# Patient Record
Sex: MALE | Employment: OTHER | URBAN - METROPOLITAN AREA
[De-identification: names, ages, dates, MRNs, and addresses within clinical notes are randomized per-mention and may not be internally consistent; named-entity substitution may affect disease eponyms.]

---

## 2018-07-20 ENCOUNTER — HOSPITAL ENCOUNTER (OUTPATIENT)
Dept: PREADMISSION TESTING | Age: 73
Discharge: HOME | End: 2018-07-20
Payer: MEDICARE

## 2018-07-20 VITALS
RESPIRATION RATE: 18 BRPM | TEMPERATURE: 98 F | HEART RATE: 62 BPM | BODY MASS INDEX: 24.06 KG/M2 | DIASTOLIC BLOOD PRESSURE: 77 MMHG | SYSTOLIC BLOOD PRESSURE: 128 MMHG | WEIGHT: 187.5 LBS | HEIGHT: 74 IN

## 2018-07-20 LAB
ANION GAP SERPL CALC-SCNC: 6 MMOL/L (ref 5–15)
BASOPHILS # BLD: 0.1 K/UL (ref 0–0.1)
BASOPHILS NFR BLD: 1 % (ref 0–1)
BUN SERPL-MCNC: 25 MG/DL (ref 6–20)
BUN/CREAT SERPL: 19 (ref 12–20)
CALCIUM SERPL-MCNC: 8.5 MG/DL (ref 8.5–10.1)
CHLORIDE SERPL-SCNC: 105 MMOL/L (ref 97–108)
CO2 SERPL-SCNC: 32 MMOL/L (ref 21–32)
CREAT SERPL-MCNC: 1.31 MG/DL (ref 0.7–1.3)
DIFFERENTIAL METHOD BLD: ABNORMAL
EOSINOPHIL # BLD: 0.4 K/UL (ref 0–0.4)
EOSINOPHIL NFR BLD: 6 % (ref 0–7)
ERYTHROCYTE [DISTWIDTH] IN BLOOD BY AUTOMATED COUNT: 12.7 % (ref 11.5–14.5)
GLUCOSE SERPL-MCNC: 90 MG/DL (ref 65–100)
HCT VFR BLD AUTO: 31.7 % (ref 36.6–50.3)
HGB BLD-MCNC: 10.4 G/DL (ref 12.1–17)
IMM GRANULOCYTES # BLD: 0 K/UL (ref 0–0.04)
IMM GRANULOCYTES NFR BLD AUTO: 0 % (ref 0–0.5)
LYMPHOCYTES # BLD: 1.1 K/UL (ref 0.8–3.5)
LYMPHOCYTES NFR BLD: 17 % (ref 12–49)
MCH RBC QN AUTO: 33.5 PG (ref 26–34)
MCHC RBC AUTO-ENTMCNC: 32.8 G/DL (ref 30–36.5)
MCV RBC AUTO: 102.3 FL (ref 80–99)
MONOCYTES # BLD: 0.8 K/UL (ref 0–1)
MONOCYTES NFR BLD: 13 % (ref 5–13)
NEUTS SEG # BLD: 3.8 K/UL (ref 1.8–8)
NEUTS SEG NFR BLD: 62 % (ref 32–75)
NRBC # BLD: 0 K/UL (ref 0–0.01)
NRBC BLD-RTO: 0 PER 100 WBC
PLATELET # BLD AUTO: 305 K/UL (ref 150–400)
PMV BLD AUTO: 11 FL (ref 8.9–12.9)
POTASSIUM SERPL-SCNC: 3.9 MMOL/L (ref 3.5–5.1)
RBC # BLD AUTO: 3.1 M/UL (ref 4.1–5.7)
SODIUM SERPL-SCNC: 143 MMOL/L (ref 136–145)
WBC # BLD AUTO: 6.2 K/UL (ref 4.1–11.1)

## 2018-07-20 PROCEDURE — 93005 ELECTROCARDIOGRAM TRACING: CPT

## 2018-07-20 PROCEDURE — 85025 COMPLETE CBC W/AUTO DIFF WBC: CPT | Performed by: OTOLARYNGOLOGY

## 2018-07-20 PROCEDURE — 80048 BASIC METABOLIC PNL TOTAL CA: CPT | Performed by: OTOLARYNGOLOGY

## 2018-07-20 RX ORDER — CARBIDOPA AND LEVODOPA 10; 100 MG/1; MG/1
1 TABLET, ORALLY DISINTEGRATING ORAL 3 TIMES DAILY
Status: ON HOLD | COMMUNITY
End: 2018-07-27 | Stop reason: SDUPTHER

## 2018-07-20 RX ORDER — FLUDROCORTISONE ACETATE 0.1 MG/1
0.2 TABLET ORAL 2 TIMES DAILY
COMMUNITY

## 2018-07-20 RX ORDER — VENLAFAXINE 75 MG/1
75 TABLET ORAL DAILY
Status: ON HOLD | COMMUNITY
End: 2018-07-26

## 2018-07-20 RX ORDER — PETROLATUM 42 G/100G
OINTMENT TOPICAL AS NEEDED
COMMUNITY
End: 2019-05-09

## 2018-07-20 RX ORDER — ACETAMINOPHEN 500 MG
500 TABLET ORAL
COMMUNITY
End: 2019-08-25

## 2018-07-20 RX ORDER — DULOXETIN HYDROCHLORIDE 60 MG/1
60 CAPSULE, DELAYED RELEASE ORAL
COMMUNITY

## 2018-07-20 RX ORDER — DIPHENHYDRAMINE HCL 25 MG
25 CAPSULE ORAL
COMMUNITY
End: 2019-05-09

## 2018-07-20 NOTE — PERIOP NOTES
PAT INTERVIEW COMPLETED WITH PT.  INFECTION PREVENTION INFORMATION GIVEN TO PT.  PT WAS GIVEN THE OPPORTUNITY TO ASK ADDITIONAL QUESTIONS.    WRITTEN DIRECTIONS ALSO SENT WITH PT'S BROTHER  TO GIVE TO [employer] [facility], WITH PAT PHONE NUMBER. PTS BROTHER ASKED TO BRING DNR PAPER WORK FROM [employer], PT VOICED THESE ARE HIS WISHES.

## 2018-07-21 LAB
ATRIAL RATE: 63 BPM
CALCULATED P AXIS, ECG09: 47 DEGREES
CALCULATED R AXIS, ECG10: 11 DEGREES
CALCULATED T AXIS, ECG11: 55 DEGREES
DIAGNOSIS, 93000: NORMAL
P-R INTERVAL, ECG05: 166 MS
Q-T INTERVAL, ECG07: 430 MS
QRS DURATION, ECG06: 74 MS
QTC CALCULATION (BEZET), ECG08: 440 MS
VENTRICULAR RATE, ECG03: 63 BPM

## 2018-07-25 ENCOUNTER — ANESTHESIA EVENT (OUTPATIENT)
Dept: SURGERY | Age: 73
DRG: 578 | End: 2018-07-25
Payer: MEDICARE

## 2018-07-26 ENCOUNTER — ANESTHESIA (OUTPATIENT)
Dept: SURGERY | Age: 73
DRG: 578 | End: 2018-07-26
Payer: MEDICARE

## 2018-07-26 ENCOUNTER — HOSPITAL ENCOUNTER (INPATIENT)
Age: 73
LOS: 1 days | Discharge: HOME | DRG: 578 | End: 2018-07-27
Attending: OTOLARYNGOLOGY | Admitting: OTOLARYNGOLOGY
Payer: MEDICARE

## 2018-07-26 DIAGNOSIS — C44.311 BASAL CELL CARCINOMA (BCC) OF SUPRATIP OF NOSE: Primary | ICD-10-CM

## 2018-07-26 PROCEDURE — 0HX3XZZ TRANSFER LEFT EAR SKIN, EXTERNAL APPROACH: ICD-10-PCS | Performed by: OTOLARYNGOLOGY

## 2018-07-26 PROCEDURE — 74011000250 HC RX REV CODE- 250

## 2018-07-26 PROCEDURE — 77030008684 HC TU ET CUF COVD -B: Performed by: ANESTHESIOLOGY

## 2018-07-26 PROCEDURE — 77030031139 HC SUT VCRL2 J&J -A: Performed by: OTOLARYNGOLOGY

## 2018-07-26 PROCEDURE — 74011250636 HC RX REV CODE- 250/636

## 2018-07-26 PROCEDURE — 77030026438 HC STYL ET INTUB CARD -A: Performed by: ANESTHESIOLOGY

## 2018-07-26 PROCEDURE — 77030018836 HC SOL IRR NACL ICUM -A: Performed by: OTOLARYNGOLOGY

## 2018-07-26 PROCEDURE — 76060000036 HC ANESTHESIA 2.5 TO 3 HR: Performed by: OTOLARYNGOLOGY

## 2018-07-26 PROCEDURE — 74011258636 HC RX REV CODE- 258/636: Performed by: OTOLARYNGOLOGY

## 2018-07-26 PROCEDURE — 74011000250 HC RX REV CODE- 250: Performed by: OTOLARYNGOLOGY

## 2018-07-26 PROCEDURE — 76210000016 HC OR PH I REC 1 TO 1.5 HR: Performed by: OTOLARYNGOLOGY

## 2018-07-26 PROCEDURE — 77030019908 HC STETH ESOPH SIMS -A: Performed by: ANESTHESIOLOGY

## 2018-07-26 PROCEDURE — 77030002996 HC SUT SLK J&J -A: Performed by: OTOLARYNGOLOGY

## 2018-07-26 PROCEDURE — 65270000029 HC RM PRIVATE

## 2018-07-26 PROCEDURE — 74011250636 HC RX REV CODE- 250/636: Performed by: ANESTHESIOLOGY

## 2018-07-26 PROCEDURE — 88305 TISSUE EXAM BY PATHOLOGIST: CPT | Performed by: OTOLARYNGOLOGY

## 2018-07-26 PROCEDURE — 77030002974 HC SUT PLN J&J -A: Performed by: OTOLARYNGOLOGY

## 2018-07-26 PROCEDURE — 77030013079 HC BLNKT BAIR HGGR 3M -A: Performed by: ANESTHESIOLOGY

## 2018-07-26 PROCEDURE — 77030011640 HC PAD GRND REM COVD -A: Performed by: OTOLARYNGOLOGY

## 2018-07-26 PROCEDURE — 77030020782 HC GWN BAIR PAWS FLX 3M -B

## 2018-07-26 PROCEDURE — 09UK07Z SUPPLEMENT NASAL MUCOSA AND SOFT TISSUE WITH AUTOLOGOUS TISSUE SUBSTITUTE, OPEN APPROACH: ICD-10-PCS | Performed by: OTOLARYNGOLOGY

## 2018-07-26 PROCEDURE — 0HX1XZZ TRANSFER FACE SKIN, EXTERNAL APPROACH: ICD-10-PCS | Performed by: OTOLARYNGOLOGY

## 2018-07-26 PROCEDURE — 0HX0XZZ TRANSFER SCALP SKIN, EXTERNAL APPROACH: ICD-10-PCS | Performed by: OTOLARYNGOLOGY

## 2018-07-26 PROCEDURE — 76010000172 HC OR TIME 2.5 TO 3 HR INTENSV-TIER 1: Performed by: OTOLARYNGOLOGY

## 2018-07-26 PROCEDURE — 77030036668 HC HEMSTAT APPL W/HEMADERM KT -BARD -F: Performed by: OTOLARYNGOLOGY

## 2018-07-26 PROCEDURE — 77030011267 HC ELECTRD BLD COVD -A: Performed by: OTOLARYNGOLOGY

## 2018-07-26 PROCEDURE — 09BK0ZZ EXCISION OF NASAL MUCOSA AND SOFT TISSUE, OPEN APPROACH: ICD-10-PCS | Performed by: OTOLARYNGOLOGY

## 2018-07-26 PROCEDURE — 74011250636 HC RX REV CODE- 250/636: Performed by: OTOLARYNGOLOGY

## 2018-07-26 PROCEDURE — 77030018846 HC SOL IRR STRL H20 ICUM -A: Performed by: OTOLARYNGOLOGY

## 2018-07-26 PROCEDURE — 88331 PATH CONSLTJ SURG 1 BLK 1SPC: CPT | Performed by: OTOLARYNGOLOGY

## 2018-07-26 PROCEDURE — 77030032490 HC SLV COMPR SCD KNE COVD -B: Performed by: OTOLARYNGOLOGY

## 2018-07-26 RX ORDER — SODIUM CHLORIDE 0.9 % (FLUSH) 0.9 %
5-10 SYRINGE (ML) INJECTION EVERY 8 HOURS
Status: DISCONTINUED | OUTPATIENT
Start: 2018-07-26 | End: 2018-07-27 | Stop reason: HOSPADM

## 2018-07-26 RX ORDER — DEXAMETHASONE SODIUM PHOSPHATE 4 MG/ML
INJECTION, SOLUTION INTRA-ARTICULAR; INTRALESIONAL; INTRAMUSCULAR; INTRAVENOUS; SOFT TISSUE AS NEEDED
Status: DISCONTINUED | OUTPATIENT
Start: 2018-07-26 | End: 2018-07-26 | Stop reason: HOSPADM

## 2018-07-26 RX ORDER — BACITRACIN ZINC 500 UNIT/G
OINTMENT (GRAM) TOPICAL 2 TIMES DAILY
Qty: 15 G | Refills: 0 | Status: SHIPPED | OUTPATIENT
Start: 2018-07-26 | End: 2019-05-09 | Stop reason: CLARIF

## 2018-07-26 RX ORDER — OXYCODONE AND ACETAMINOPHEN 5; 325 MG/1; MG/1
1 TABLET ORAL AS NEEDED
Status: DISCONTINUED | OUTPATIENT
Start: 2018-07-26 | End: 2018-07-26 | Stop reason: HOSPADM

## 2018-07-26 RX ORDER — PROPOFOL 10 MG/ML
INJECTION, EMULSION INTRAVENOUS AS NEEDED
Status: DISCONTINUED | OUTPATIENT
Start: 2018-07-26 | End: 2018-07-26 | Stop reason: HOSPADM

## 2018-07-26 RX ORDER — LEVOTHYROXINE SODIUM 112 UG/1
224 TABLET ORAL
Status: DISCONTINUED | OUTPATIENT
Start: 2018-07-27 | End: 2018-07-27 | Stop reason: HOSPADM

## 2018-07-26 RX ORDER — CEPHALEXIN 250 MG/1
500 CAPSULE ORAL 3 TIMES DAILY
Qty: 42 CAP | Refills: 0 | Status: SHIPPED | OUTPATIENT
Start: 2018-07-26 | End: 2018-08-02

## 2018-07-26 RX ORDER — VENLAFAXINE HYDROCHLORIDE 37.5 MG/1
150 CAPSULE, EXTENDED RELEASE ORAL
Status: DISCONTINUED | OUTPATIENT
Start: 2018-07-27 | End: 2018-07-27 | Stop reason: HOSPADM

## 2018-07-26 RX ORDER — HYDROCODONE BITARTRATE AND ACETAMINOPHEN 7.5; 325 MG/1; MG/1
2 TABLET ORAL
Qty: 30 TAB | Refills: 0 | Status: SHIPPED | OUTPATIENT
Start: 2018-07-26 | End: 2019-05-09

## 2018-07-26 RX ORDER — SODIUM CHLORIDE 0.9 % (FLUSH) 0.9 %
5-10 SYRINGE (ML) INJECTION EVERY 8 HOURS
Status: DISCONTINUED | OUTPATIENT
Start: 2018-07-26 | End: 2018-07-26 | Stop reason: HOSPADM

## 2018-07-26 RX ORDER — CARBIDOPA AND LEVODOPA 10; 100 MG/1; MG/1
1 TABLET ORAL 3 TIMES DAILY
Status: DISCONTINUED | OUTPATIENT
Start: 2018-07-27 | End: 2018-07-27 | Stop reason: HOSPADM

## 2018-07-26 RX ORDER — MIDAZOLAM HYDROCHLORIDE 1 MG/ML
1 INJECTION, SOLUTION INTRAMUSCULAR; INTRAVENOUS AS NEEDED
Status: DISCONTINUED | OUTPATIENT
Start: 2018-07-26 | End: 2018-07-26 | Stop reason: HOSPADM

## 2018-07-26 RX ORDER — FLUDROCORTISONE ACETATE 0.1 MG/1
100 TABLET ORAL DAILY
Status: DISCONTINUED | OUTPATIENT
Start: 2018-07-27 | End: 2018-07-27 | Stop reason: HOSPADM

## 2018-07-26 RX ORDER — SODIUM CHLORIDE 9 MG/ML
25 INJECTION, SOLUTION INTRAVENOUS CONTINUOUS
Status: DISPENSED | OUTPATIENT
Start: 2018-07-26 | End: 2018-07-27

## 2018-07-26 RX ORDER — EPHEDRINE SULFATE 50 MG/ML
INJECTION, SOLUTION INTRAVENOUS AS NEEDED
Status: DISCONTINUED | OUTPATIENT
Start: 2018-07-26 | End: 2018-07-26 | Stop reason: HOSPADM

## 2018-07-26 RX ORDER — VENLAFAXINE HYDROCHLORIDE 150 MG/1
150 CAPSULE, EXTENDED RELEASE ORAL DAILY
COMMUNITY
End: 2019-05-09

## 2018-07-26 RX ORDER — BACITRACIN 500 [USP'U]/G
OINTMENT TOPICAL 2 TIMES DAILY
Status: DISCONTINUED | OUTPATIENT
Start: 2018-07-27 | End: 2018-07-27 | Stop reason: HOSPADM

## 2018-07-26 RX ORDER — SODIUM CHLORIDE 0.9 % (FLUSH) 0.9 %
5-10 SYRINGE (ML) INJECTION AS NEEDED
Status: DISCONTINUED | OUTPATIENT
Start: 2018-07-26 | End: 2018-07-26 | Stop reason: HOSPADM

## 2018-07-26 RX ORDER — FENTANYL CITRATE 50 UG/ML
25 INJECTION, SOLUTION INTRAMUSCULAR; INTRAVENOUS
Status: DISCONTINUED | OUTPATIENT
Start: 2018-07-26 | End: 2018-07-26 | Stop reason: HOSPADM

## 2018-07-26 RX ORDER — SUCCINYLCHOLINE CHLORIDE 20 MG/ML
INJECTION INTRAMUSCULAR; INTRAVENOUS AS NEEDED
Status: DISCONTINUED | OUTPATIENT
Start: 2018-07-26 | End: 2018-07-26 | Stop reason: HOSPADM

## 2018-07-26 RX ORDER — SODIUM CHLORIDE, SODIUM LACTATE, POTASSIUM CHLORIDE, CALCIUM CHLORIDE 600; 310; 30; 20 MG/100ML; MG/100ML; MG/100ML; MG/100ML
125 INJECTION, SOLUTION INTRAVENOUS CONTINUOUS
Status: DISCONTINUED | OUTPATIENT
Start: 2018-07-26 | End: 2018-07-26 | Stop reason: HOSPADM

## 2018-07-26 RX ORDER — SODIUM CHLORIDE 0.9 % (FLUSH) 0.9 %
5-10 SYRINGE (ML) INJECTION AS NEEDED
Status: DISCONTINUED | OUTPATIENT
Start: 2018-07-26 | End: 2018-07-27 | Stop reason: HOSPADM

## 2018-07-26 RX ORDER — SODIUM CHLORIDE, SODIUM LACTATE, POTASSIUM CHLORIDE, CALCIUM CHLORIDE 600; 310; 30; 20 MG/100ML; MG/100ML; MG/100ML; MG/100ML
INJECTION, SOLUTION INTRAVENOUS
Status: DISCONTINUED | OUTPATIENT
Start: 2018-07-26 | End: 2018-07-26 | Stop reason: HOSPADM

## 2018-07-26 RX ORDER — MIDAZOLAM HYDROCHLORIDE 1 MG/ML
0.5 INJECTION, SOLUTION INTRAMUSCULAR; INTRAVENOUS
Status: DISCONTINUED | OUTPATIENT
Start: 2018-07-26 | End: 2018-07-26 | Stop reason: HOSPADM

## 2018-07-26 RX ORDER — ONDANSETRON 2 MG/ML
4 INJECTION INTRAMUSCULAR; INTRAVENOUS AS NEEDED
Status: DISCONTINUED | OUTPATIENT
Start: 2018-07-26 | End: 2018-07-26 | Stop reason: HOSPADM

## 2018-07-26 RX ORDER — ACETAMINOPHEN 325 MG/1
650 TABLET ORAL
Status: DISCONTINUED | OUTPATIENT
Start: 2018-07-26 | End: 2018-07-27 | Stop reason: HOSPADM

## 2018-07-26 RX ORDER — DULOXETIN HYDROCHLORIDE 60 MG/1
60 CAPSULE, DELAYED RELEASE ORAL
Status: DISCONTINUED | OUTPATIENT
Start: 2018-07-26 | End: 2018-07-27 | Stop reason: HOSPADM

## 2018-07-26 RX ORDER — PROPOFOL 10 MG/ML
INJECTION, EMULSION INTRAVENOUS
Status: DISCONTINUED | OUTPATIENT
Start: 2018-07-26 | End: 2018-07-26 | Stop reason: HOSPADM

## 2018-07-26 RX ORDER — DIPHENHYDRAMINE HYDROCHLORIDE 50 MG/ML
12.5 INJECTION, SOLUTION INTRAMUSCULAR; INTRAVENOUS AS NEEDED
Status: DISCONTINUED | OUTPATIENT
Start: 2018-07-26 | End: 2018-07-26 | Stop reason: HOSPADM

## 2018-07-26 RX ORDER — MORPHINE SULFATE 10 MG/ML
2 INJECTION, SOLUTION INTRAMUSCULAR; INTRAVENOUS
Status: DISCONTINUED | OUTPATIENT
Start: 2018-07-26 | End: 2018-07-26 | Stop reason: HOSPADM

## 2018-07-26 RX ORDER — MORPHINE SULFATE 1 MG/ML
2 INJECTION, SOLUTION EPIDURAL; INTRATHECAL; INTRAVENOUS
Status: DISCONTINUED | OUTPATIENT
Start: 2018-07-26 | End: 2018-07-27 | Stop reason: HOSPADM

## 2018-07-26 RX ORDER — CEFAZOLIN SODIUM/WATER 2 G/20 ML
2 SYRINGE (ML) INTRAVENOUS ONCE
Status: COMPLETED | OUTPATIENT
Start: 2018-07-26 | End: 2018-07-26

## 2018-07-26 RX ORDER — ONDANSETRON 2 MG/ML
INJECTION INTRAMUSCULAR; INTRAVENOUS AS NEEDED
Status: DISCONTINUED | OUTPATIENT
Start: 2018-07-26 | End: 2018-07-26 | Stop reason: HOSPADM

## 2018-07-26 RX ORDER — FENTANYL CITRATE 50 UG/ML
INJECTION, SOLUTION INTRAMUSCULAR; INTRAVENOUS AS NEEDED
Status: DISCONTINUED | OUTPATIENT
Start: 2018-07-26 | End: 2018-07-26 | Stop reason: HOSPADM

## 2018-07-26 RX ORDER — LIDOCAINE HYDROCHLORIDE 10 MG/ML
0.1 INJECTION, SOLUTION EPIDURAL; INFILTRATION; INTRACAUDAL; PERINEURAL AS NEEDED
Status: DISCONTINUED | OUTPATIENT
Start: 2018-07-26 | End: 2018-07-26 | Stop reason: HOSPADM

## 2018-07-26 RX ORDER — ROCURONIUM BROMIDE 10 MG/ML
INJECTION, SOLUTION INTRAVENOUS AS NEEDED
Status: DISCONTINUED | OUTPATIENT
Start: 2018-07-26 | End: 2018-07-26 | Stop reason: HOSPADM

## 2018-07-26 RX ORDER — OXYCODONE AND ACETAMINOPHEN 5; 325 MG/1; MG/1
1 TABLET ORAL
Status: DISCONTINUED | OUTPATIENT
Start: 2018-07-26 | End: 2018-07-27 | Stop reason: HOSPADM

## 2018-07-26 RX ORDER — DEXTROSE, SODIUM CHLORIDE, SODIUM LACTATE, POTASSIUM CHLORIDE, AND CALCIUM CHLORIDE 5; .6; .31; .03; .02 G/100ML; G/100ML; G/100ML; G/100ML; G/100ML
100 INJECTION, SOLUTION INTRAVENOUS CONTINUOUS
Status: DISPENSED | OUTPATIENT
Start: 2018-07-26 | End: 2018-07-27

## 2018-07-26 RX ORDER — VENLAFAXINE 37.5 MG/1
75 TABLET ORAL DAILY
Status: DISCONTINUED | OUTPATIENT
Start: 2018-07-27 | End: 2018-07-26 | Stop reason: SDUPTHER

## 2018-07-26 RX ORDER — DIPHENHYDRAMINE HCL 25 MG
25 CAPSULE ORAL
Status: DISCONTINUED | OUTPATIENT
Start: 2018-07-26 | End: 2018-07-27 | Stop reason: HOSPADM

## 2018-07-26 RX ORDER — CEFAZOLIN SODIUM/WATER 2 G/20 ML
2 SYRINGE (ML) INTRAVENOUS
Status: DISCONTINUED | OUTPATIENT
Start: 2018-07-26 | End: 2018-07-26 | Stop reason: SDUPTHER

## 2018-07-26 RX ORDER — LIDOCAINE HYDROCHLORIDE 20 MG/ML
INJECTION, SOLUTION EPIDURAL; INFILTRATION; INTRACAUDAL; PERINEURAL AS NEEDED
Status: DISCONTINUED | OUTPATIENT
Start: 2018-07-26 | End: 2018-07-26 | Stop reason: HOSPADM

## 2018-07-26 RX ORDER — HYDROCODONE BITARTRATE AND ACETAMINOPHEN 5; 325 MG/1; MG/1
2 TABLET ORAL
Qty: 30 TAB | Refills: 0 | Status: SHIPPED | OUTPATIENT
Start: 2018-07-26 | End: 2019-05-09

## 2018-07-26 RX ORDER — FENTANYL CITRATE 50 UG/ML
50 INJECTION, SOLUTION INTRAMUSCULAR; INTRAVENOUS AS NEEDED
Status: DISCONTINUED | OUTPATIENT
Start: 2018-07-26 | End: 2018-07-26 | Stop reason: HOSPADM

## 2018-07-26 RX ORDER — MIDAZOLAM HYDROCHLORIDE 1 MG/ML
INJECTION, SOLUTION INTRAMUSCULAR; INTRAVENOUS AS NEEDED
Status: DISCONTINUED | OUTPATIENT
Start: 2018-07-26 | End: 2018-07-26 | Stop reason: HOSPADM

## 2018-07-26 RX ADMIN — FENTANYL CITRATE 50 MCG: 50 INJECTION, SOLUTION INTRAMUSCULAR; INTRAVENOUS at 18:00

## 2018-07-26 RX ADMIN — EPHEDRINE SULFATE 10 MG: 50 INJECTION, SOLUTION INTRAVENOUS at 17:50

## 2018-07-26 RX ADMIN — SODIUM CHLORIDE, SODIUM LACTATE, POTASSIUM CHLORIDE, CALCIUM CHLORIDE: 600; 310; 30; 20 INJECTION, SOLUTION INTRAVENOUS at 17:45

## 2018-07-26 RX ADMIN — MIDAZOLAM HYDROCHLORIDE 1 MG: 1 INJECTION, SOLUTION INTRAMUSCULAR; INTRAVENOUS at 17:45

## 2018-07-26 RX ADMIN — PROPOFOL 120 MG: 10 INJECTION, EMULSION INTRAVENOUS at 17:45

## 2018-07-26 RX ADMIN — LIDOCAINE HYDROCHLORIDE 40 MG: 20 INJECTION, SOLUTION EPIDURAL; INFILTRATION; INTRACAUDAL; PERINEURAL at 17:45

## 2018-07-26 RX ADMIN — EPHEDRINE SULFATE 10 MG: 50 INJECTION, SOLUTION INTRAVENOUS at 18:21

## 2018-07-26 RX ADMIN — PROPOFOL 30 MG: 10 INJECTION, EMULSION INTRAVENOUS at 19:10

## 2018-07-26 RX ADMIN — EPHEDRINE SULFATE 10 MG: 50 INJECTION, SOLUTION INTRAVENOUS at 18:49

## 2018-07-26 RX ADMIN — SUCCINYLCHOLINE CHLORIDE 140 MG: 20 INJECTION INTRAMUSCULAR; INTRAVENOUS at 17:45

## 2018-07-26 RX ADMIN — EPHEDRINE SULFATE 10 MG: 50 INJECTION, SOLUTION INTRAVENOUS at 18:06

## 2018-07-26 RX ADMIN — SODIUM CHLORIDE, SODIUM LACTATE, POTASSIUM CHLORIDE, CALCIUM CHLORIDE, AND DEXTROSE MONOHYDRATE 100 ML/HR: 600; 310; 30; 20; 5 INJECTION, SOLUTION INTRAVENOUS at 21:00

## 2018-07-26 RX ADMIN — Medication 2 G: at 17:50

## 2018-07-26 RX ADMIN — ONDANSETRON 4 MG: 2 INJECTION INTRAMUSCULAR; INTRAVENOUS at 18:29

## 2018-07-26 RX ADMIN — MORPHINE SULFATE 2 MG: 10 INJECTION INTRAVENOUS at 21:10

## 2018-07-26 RX ADMIN — FENTANYL CITRATE 25 MCG: 50 INJECTION, SOLUTION INTRAMUSCULAR; INTRAVENOUS at 17:45

## 2018-07-26 RX ADMIN — PROPOFOL 50 MG: 10 INJECTION, EMULSION INTRAVENOUS at 18:37

## 2018-07-26 RX ADMIN — PROPOFOL 30 MG: 10 INJECTION, EMULSION INTRAVENOUS at 18:30

## 2018-07-26 RX ADMIN — FENTANYL CITRATE 25 MCG: 50 INJECTION, SOLUTION INTRAMUSCULAR; INTRAVENOUS at 19:10

## 2018-07-26 RX ADMIN — MORPHINE SULFATE 2 MG: 10 INJECTION INTRAVENOUS at 21:17

## 2018-07-26 RX ADMIN — PROPOFOL 75 MCG/KG/MIN: 10 INJECTION, EMULSION INTRAVENOUS at 18:59

## 2018-07-26 RX ADMIN — SODIUM CHLORIDE, SODIUM LACTATE, POTASSIUM CHLORIDE, AND CALCIUM CHLORIDE 125 ML/HR: 600; 310; 30; 20 INJECTION, SOLUTION INTRAVENOUS at 16:05

## 2018-07-26 RX ADMIN — ROCURONIUM BROMIDE 5 MG: 10 INJECTION, SOLUTION INTRAVENOUS at 17:45

## 2018-07-26 RX ADMIN — EPHEDRINE SULFATE 10 MG: 50 INJECTION, SOLUTION INTRAVENOUS at 18:27

## 2018-07-26 RX ADMIN — DEXAMETHASONE SODIUM PHOSPHATE 4 MG: 4 INJECTION, SOLUTION INTRA-ARTICULAR; INTRALESIONAL; INTRAMUSCULAR; INTRAVENOUS; SOFT TISSUE at 17:52

## 2018-07-26 RX ADMIN — EPHEDRINE SULFATE 5 MG: 50 INJECTION, SOLUTION INTRAVENOUS at 19:38

## 2018-07-26 NOTE — H&P
History and Physical    Subjective:     Abelino Hummel is a 71 y.o.  male who presents with multiple skin cancer wound defects, negative margins from prior excisions, and right nasal tip and nostril BCCa. Past Medical History:   Diagnosis Date    Arthritis     Cancer ([facility] [address] 2011    BCCA,SCCA CHEEK AND NOSE    Chronic pain     LEGS    Depression with anxiety     Elevated cholesterol     Essential tremor     ADRIAN ARMS- LEFT IS WORSE    GERD (gastroesophageal reflux disease)     h/o Thyroid cancer 2009    levothyroxine    Hypertension     Parkinson's disease ([facility] [address]     Psychiatric disorder     ANXIETY AND DEPRESSION      Past Surgical History:   Procedure Laterality Date    HX GI      COLONOSCOPY    HX HEENT  2009    mass removed from neck    HX ORTHOPAEDIC      RIGHT BUNIONECTOMY    HX OTHER SURGICAL  2009    thyroidectomy    HX OTHER SURGICAL      MANY BCCA REMOVAL WITH MAC    HX OTHER SURGICAL      FUNCTIONAL IMPLANT- LEFT CHEST    HX RETINAL DETACHMENT REPAIR  1997     Family History   Problem Relation Age of Onset    Cancer Mother      BCCA    Heart Disease Mother      CHF    Alcohol abuse Father     Stroke Father     Hypertension Father     No Known Problems Brother     No Known Problems Son     No Known Problems Daughter     Anesth Problems Neg Hx       Social History   Substance Use Topics    Smoking status: Never Smoker    Smokeless tobacco: Never Used    Alcohol use 10.0 oz/week     20 Shots of liquor per week      Comment: 8 OZ DAILY-WINE OR VODKA       Prior to Admission medications    Medication Sig Start Date End Date Taking? Authorizing Provider   carbidopa-levodopa (PARCOPA)  mg rapid dissolve tablet Take 1 Tab by mouth three (3) times daily. Historical Provider   DULoxetine (CYMBALTA) 60 mg capsule Take 60 mg by mouth nightly. Historical Provider   fludrocortisone (FLORINEF) 0.1 mg tablet Take  by mouth two (2) times a day.     Historical Provider levothyroxine sodium (LEVOTHYROXINE PO) Take 225 mcg by mouth daily. Historical Provider   acetaminophen (TYLENOL EXTRA STRENGTH) 500 mg tablet Take 500 mg by mouth every six (6) hours as needed for Pain (NO MORE THAN 4000MG PER DAY). Historical Provider   venlafaxine (EFFEXOR) 75 mg tablet Take 75 mg by mouth daily. Historical Provider   diphenhydrAMINE (BENADRYL) 25 mg capsule Take 25 mg by mouth every four (4) hours as needed. Historical Provider   mineral oil-hydrophil petrolat (AQUAPHOR) ointment Apply  to affected area as needed for Skin Irritation (APPLY TO GROIN RASH BID PRN). Historical Provider   bacitracin (BACITRACIN) ointment Apply  to affected area two (2) times a day. Patient taking differently: Apply  to affected area two (2) times a day. APPLY TO LEFT EAR, LEFT FACE, NOSE, RIGHT EAR, RIGHT SIDE OF NECK  BID 4/2/15   Mendoza Ortega MD     No Known Allergies     Review of Systems:  A comprehensive review of systems was negative except for that written in the History of Present Illness. Objective: Intake and Output:            Physical Exam:   Visit Vitals    /88 (BP 1 Location: Right arm, BP Patient Position: At rest)    Pulse 84    Temp 98.2 °F (36.8 °C)    Resp 20    Ht 6' 2\" (1.88 m)    Wt 85 kg (187 lb 8 oz)    SpO2 99%    BMI 24.07 kg/m2     General:  Alert, cooperative, no distress, appears stated age. Head:  Normocephalic, without obvious abnormality, atraumatic. Eyes:  Conjunctivae/corneas clear. PERRL, EOMs intact. Fundi benign   Ears:  Normal TMs and external ear canals both ears. Nose: Nares normal. Septum midline. Mucosa normal. No drainage or sinus tenderness. Throat: Lips, mucosa, and tongue normal. Teeth and gums normal.   Neck: Supple, symmetrical, trachea midline, no adenopathy, thyroid: no enlargement/tenderness/nodules, no carotid bruit and no JVD. Back:   Symmetric, no curvature. ROM normal. No CVA tenderness.    Lungs:   Clear to auscultation bilaterally. Chest wall:  No tenderness or deformity. Heart:  Regular rate and rhythm, S1, S2 normal, no murmur, click, rub or gallop. Abdomen:   Soft, non-tender. Bowel sounds normal. No masses,  No organomegaly. Genitalia:  Normal male without lesion, discharge or tenderness. Rectal:  Normal tone, normal prostate, no masses or tenderness  Guaiac negative stool. Extremities: Extremities normal, atraumatic, no cyanosis or edema. Pulses: 2+ and symmetric all extremities. Skin: Skin color, texture, turgor normal. No rashes or lesions   Lymph nodes: Cervical, supraclavicular, and axillary nodes normal.   Neurologic: CNII-XII intact. Normal strength, sensation and reflexes throughout. Data Review:   No results found for this or any previous visit (from the past 24 hour(s)). Assessment:     BCC nose, tip  BCC wound defects right cheek, left cheek and left ear. Plan:     Flap repair of right neck/cheek, left cheek and left auricular skin cancer wound defect. Excision of nasal BCC with flap repair, possibly forehead flap,nasolabial, with or without auricular graft.      Signed By:

## 2018-07-26 NOTE — PERIOP NOTES
Multi skin areas 1) area to right nostril, 2) area to left cheek, 3) area to left ear lobe 4) area to lower back with dsg intact, 5) area to left knee 6) area to right outer shin 7) skin tear left index finger knuckle 8)area to right arm below elbow with dsg intact, 9) area to right cheek/neck with dsg intact.  10) multi scattered bruises

## 2018-07-26 NOTE — ANESTHESIA PREPROCEDURE EVALUATION
Anesthetic History   No history of anesthetic complications            Review of Systems / Medical History  Patient summary reviewed, nursing notes reviewed and pertinent labs reviewed    Pulmonary  Within defined limits                 Neuro/Psych   Within defined limits          Comments: Parkinsons Cardiovascular                  Exercise tolerance: <4 METS  Comments: orthostatic hypotension   GI/Hepatic/Renal     GERD    Renal disease: CRI       Endo/Other      Hypothyroidism: well controlled  Anemia     Other Findings   Comments: BCca         Physical Exam    Airway  Mallampati: II  TM Distance: > 6 cm  Neck ROM: normal range of motion   Mouth opening: Diminished (comment)     Cardiovascular  Regular rate and rhythm,  S1 and S2 normal,  no murmur, click, rub, or gallop             Dental  No notable dental hx       Pulmonary  Breath sounds clear to auscultation               Abdominal  GI exam deferred       Other Findings            Anesthetic Plan    ASA: 3  Anesthesia type: general          Induction: Intravenous  Anesthetic plan and risks discussed with: Patient

## 2018-07-26 NOTE — IP AVS SNAPSHOT
[address] 
[phone] Patient: Milan Villarreal MRN: [id number] JCR:[id number] You are allergic to the following No active allergies Recent Documentation Height Weight BMI Smoking Status 1.88 m 85 kg 24.07 kg/m2 Never Smoker Emergency Contacts  (Rel.) Home Phone Work Phone Mobile Phone Hallie Wilson (Brother) [phone] -- -- About your hospitalization You were admitted on:  July 26, 2018 You last received care in the:  [facility] You were discharged on:  July 27, 2018 Why you were hospitalized Your primary diagnosis was:  Not on File Your diagnoses also included:  Basal Cell Carcinoma (Bcc) Of Supratip Of Nose Providers Seen During Your Hospitalization Provider Specialty Primary office phone Nicko Driver MD Otolaryngology [phone] Your Primary Care Physician (PCP) Primary Care Physician Office Phone Office Fax Daniel Campbell [phone] Follow-up Information Follow up With Details Comments Contact Info Ivana Stone MD   [address] [facility] [address] [facility] 
[phone] Nicko Driver MD  Follow up with Dr. Nidia Abebe on Tuesday, July 31st.  Please call office to make apt.  [address] 
[phone] My Medications TAKE these medications as instructed Instructions Each Dose to Equal  
 Morning Noon Evening Bedtime AQUAPHOR ointment Generic drug:  mineral oil-hydrophil petrolat Your last dose was: Your next dose is:    
   
   
 Apply  to affected area as needed for Skin Irritation (APPLY TO GROIN RASH BID PRN). * bacitracin zinc ointment Commonly known as:  BACITRACIN Your last dose was: Your next dose is: Apply  to affected area two (2) times a day. * bacitracin zinc ointment Commonly known as:  BACITRACIN Your last dose was: Your next dose is:    
   
   
 Apply  to affected area two (2) times a day. BENADRYL 25 mg capsule Generic drug:  diphenhydrAMINE Your last dose was: Your next dose is: Take 25 mg by mouth every four (4) hours as needed. 25 mg  
    
   
   
   
  
 * cephALEXin 250 mg capsule Commonly known as:  [place] Your last dose was: Your next dose is: Take 2 Caps by mouth three (3) times daily for 7 days. 500 mg  
    
   
   
   
  
 * cephALEXin 250 mg capsule Commonly known as:  [place] R[place] Your last dose was: Your next dose is: Take 2 Caps by mouth three (3) times daily for 7 days. 500 mg  
    
   
   
   
  
 CYMBALTA 60 mg capsule Generic drug:  DULoxetine Your last dose was: Your next dose is: Take 60 mg by mouth nightly. 60 mg  
    
   
   
   
  
 fludrocortisone 0.1 mg tablet Commonly known as:  FLORINEF Your last dose was: Your next dose is: Take 0.1 mg by mouth daily. 0.1 mg  
    
   
   
   
  
 * HYDROcodone-acetaminophen 5-325 mg per tablet Commonly known as:  Sagar Osullivan Your last dose was: Your next dose is: Take 2 Tabs by mouth every six (6) hours as needed for Pain for up to 30 doses. Max Daily Amount: 8 Tabs. Indications: Pain 2 Tab  
    
   
   
   
  
 * HYDROcodone-acetaminophen 7.5-325 mg per tablet Commonly known as:  Sagar Osullivan Your last dose was: Your next dose is: Take 2 Tabs by mouth every six (6) hours as needed for Pain for up to 30 doses. Max Daily Amount: 8 Tabs. Indications: Pain 2 Tab LEVOTHYROXINE PO Your last dose was: Your next dose is: Take 225 mcg by mouth daily. 225 mcg TYLENOL EXTRA STRENGTH 500 mg tablet Generic drug:  acetaminophen Your last dose was: Your next dose is: Take 500 mg by mouth every six (6) hours as needed for Pain (NO MORE THAN 4000MG PER DAY). 500 mg * Notice: This list has 6 medication(s) that are the same as other medications prescribed for you. Read the directions carefully, and ask your doctor or other care provider to review them with you. ASK your physician about these medications Instructions Each Dose to Equal  
 Morning Noon Evening Bedtime  
 carbidopa-levodopa  mg per tablet Commonly known as:  SINEMET Ask about: Which instructions should I use? Your last dose was: Your next dose is: Take 1 Tab by mouth three (3) times daily. 1 Tab EFFEXOR  mg capsule Generic drug:  venlafaxine-SR Ask about: Which instructions should I use? Your last dose was: Your next dose is: Take 150 mg by mouth daily. 150 mg Where to Get Your Medications These medications were sent to [facility] [address] [place] [address], [address] [place] [address] Phone:  [phone]  
  bacitracin zinc ointment Information on where to get these meds will be given to you by the nurse or doctor. ! Ask your nurse or doctor about these medications  
  cephALEXin 250 mg capsule  
 cephALEXin 250 mg capsule HYDROcodone-acetaminophen 5-325 mg per tablet HYDROcodone-acetaminophen 7.5-325 mg per tablet Discharge Instructions Wound Care: After Your Visit Your Care Instructions Taking good care of your wound at home will help it heal quickly and reduce your chance of infection. The doctor has checked you carefully, but problems can develop later.  If you notice any problems or new symptoms, get medical treatment right away. Follow-up care is a key part of your treatment and safety. Be sure to make and go to all appointments, and call your doctor if you are having problems. It's also a good idea to know your test results and keep a list of the medicines you take. How can you care for yourself at home? · Clean the area with soap and water 2 times a day unless your doctor gives you different instructions. Don't use hydrogen peroxide or alcohol, which can slow healing. ¨ You may cover the wound with a thin layer of antibiotic ointment, such as bacitracin, and a nonstick bandage. ¨ Apply more ointment and replace the bandage as needed. · Take pain medicines exactly as directed. Some pain is normal with a wound, but do not ignore pain that is getting worse instead of better. You could have an infection. ¨ If the doctor gave you a prescription medicine for pain, take it as prescribed. ¨ If you are not taking a prescription pain medicine, ask your doctor if you can take an over-the-counter medicine. · Your doctor may have closed your wound with stitches (sutures), staples, or skin glue. ¨ If you have stitches, your doctor may remove them after several days to 2 weeks. Or you may have stitches that dissolve on their own. ¨ If you have staples, your doctor may remove them after 7 to 10 days. ¨ If your wound was closed with skin glue, the glue will wear off in a few days to 2 weeks. When should you call for help? Call your doctor now or seek immediate medical care if: 
· You have signs of infection, such as: 
¨ Increased pain, swelling, warmth, or redness near the wound. ¨ Red streaks leading from the wound. ¨ Pus draining from the wound. ¨ A fever. · You bleed so much from your incision that you soak one or more bandages over 2 to 4 hours. Watch closely for changes in your health, and be sure to contact your doctor if: · The wound is not getting better each day. Where can you learn more? Go to Sunsea.be Enter I697 in the search box to learn more about \"Wound Care: After Your Visit. \"  
© 0740-0616 Healthwise, Incorporated. Care instructions adapted under license by Asya Burks (which disclaims liability or warranty for this information). This care instruction is for use with your licensed healthcare professional. If you have questions about a medical condition or this instruction, always ask your healthcare professional. [address] any warranty or liability for your use of this information. Content Version: 77.6.563034; Last Revised: April 23, 2012 Please apply vasoline to wounds BID. Discharge Orders None Introducing [facility]! Asya Burks introduces Fast[employer] patient portal. Now you can access parts of your medical record, email your doctor's office, and request medication refills online. 1. In your internet browser, go to [email or web address] 2. Click on the First Time User? Click Here link in the Sign In box. You will see the New Member Sign Up page. 3. Enter your FastCAP Access Code exactly as it appears below. You will not need to use this code after youve completed the sign-up process. If you do not sign up before the expiration date, you must request a new code. · Fast[employer] Access Code: [email or web address] Expires: 10/18/2018  2:19 PM 
 
4. Enter the last four digits of your Social Security Number (xxxx) and Date of Birth (mm/dd/yyyy) as indicated and click Submit. You will be taken to the next sign-up page. 5. Create a FastCAP ID. This will be your FastCAP login ID and cannot be changed, so think of one that is secure and easy to remember. 6. Create a FastCAP password. You can change your password at any time. 7. Enter your Password Reset Question and Answer.  This can be used at a later time if you forget your password. 8. Enter your e-mail address. You will receive e-mail notification when new information is available in [address] 9. Click Sign Up. You can now view and download portions of your medical record. 10. Click the Download Summary menu link to download a portable copy of your medical information. If you have questions, please visit the Frequently Asked Questions section of the [employer] website. Remember, [employer] is NOT to be used for urgent needs. For medical emergencies, dial 911. Now available from your iPhone and Android! General Information Please provide this summary of care documentation to your next provider. Patient Signature:  ____________________________________________________________ Date:  ____________________________________________________________  
  
Padmini Artist Provider Signature:  ____________________________________________________________ Date:  ____________________________________________________________

## 2018-07-26 NOTE — DISCHARGE INSTRUCTIONS
Wound Care: After Your Visit  Your Care Instructions  Taking good care of your wound at home will help it heal quickly and reduce your chance of infection. The doctor has checked you carefully, but problems can develop later. If you notice any problems or new symptoms, get medical treatment right away. Follow-up care is a key part of your treatment and safety. Be sure to make and go to all appointments, and call your doctor if you are having problems. It's also a good idea to know your test results and keep a list of the medicines you take. How can you care for yourself at home? · Clean the area with soap and water 2 times a day unless your doctor gives you different instructions. Don't use hydrogen peroxide or alcohol, which can slow healing. ¨ You may cover the wound with a thin layer of antibiotic ointment, such as bacitracin, and a nonstick bandage. ¨ Apply more ointment and replace the bandage as needed. · Take pain medicines exactly as directed. Some pain is normal with a wound, but do not ignore pain that is getting worse instead of better. You could have an infection. ¨ If the doctor gave you a prescription medicine for pain, take it as prescribed. ¨ If you are not taking a prescription pain medicine, ask your doctor if you can take an over-the-counter medicine. · Your doctor may have closed your wound with stitches (sutures), staples, or skin glue. ¨ If you have stitches, your doctor may remove them after several days to 2 weeks. Or you may have stitches that dissolve on their own. ¨ If you have staples, your doctor may remove them after 7 to 10 days. ¨ If your wound was closed with skin glue, the glue will wear off in a few days to 2 weeks. When should you call for help? Call your doctor now or seek immediate medical care if:  · You have signs of infection, such as:  ¨ Increased pain, swelling, warmth, or redness near the wound. ¨ Red streaks leading from the wound.   ¨ Pus draining from the wound. ¨ A fever. · You bleed so much from your incision that you soak one or more bandages over 2 to 4 hours. Watch closely for changes in your health, and be sure to contact your doctor if:  · The wound is not getting better each day. Where can you learn more? Go to Fleecs.be  Enter M973 in the search box to learn more about \"Wound Care: After Your Visit. \"   © 5799-3428 Healthwise, Incorporated. Care instructions adapted under license by [facility] (which disclaims liability or warranty for this information). This care instruction is for use with your licensed healthcare professional. If you have questions about a medical condition or this instruction, always ask your healthcare professional. [address] any warranty or liability for your use of this information. Content Version: 19.0.655264; Last Revised: April 23, 2012      Please apply vasoline to wounds BID.

## 2018-07-27 VITALS
HEIGHT: 74 IN | RESPIRATION RATE: 18 BRPM | OXYGEN SATURATION: 97 % | HEART RATE: 79 BPM | BODY MASS INDEX: 24.06 KG/M2 | TEMPERATURE: 98.6 F | WEIGHT: 187.5 LBS | SYSTOLIC BLOOD PRESSURE: 137 MMHG | DIASTOLIC BLOOD PRESSURE: 78 MMHG

## 2018-07-27 PROCEDURE — 74011000250 HC RX REV CODE- 250: Performed by: OTOLARYNGOLOGY

## 2018-07-27 PROCEDURE — 74011250637 HC RX REV CODE- 250/637: Performed by: OTOLARYNGOLOGY

## 2018-07-27 PROCEDURE — 74011000250 HC RX REV CODE- 250

## 2018-07-27 PROCEDURE — 94760 N-INVAS EAR/PLS OXIMETRY 1: CPT

## 2018-07-27 PROCEDURE — 74011258636 HC RX REV CODE- 258/636: Performed by: OTOLARYNGOLOGY

## 2018-07-27 RX ORDER — CARBIDOPA AND LEVODOPA 10; 100 MG/1; MG/1
1 TABLET ORAL 3 TIMES DAILY
COMMUNITY
End: 2019-05-09

## 2018-07-27 RX ORDER — BACITRACIN 500 UNIT/G
PACKET (EA) TOPICAL
Status: COMPLETED
Start: 2018-07-27 | End: 2018-07-27

## 2018-07-27 RX ADMIN — OXYCODONE AND ACETAMINOPHEN 1 TABLET: 5; 325 TABLET ORAL at 01:28

## 2018-07-27 RX ADMIN — BACITRACIN 1 PACKET: 500 OINTMENT TOPICAL at 08:59

## 2018-07-27 RX ADMIN — VENLAFAXINE HYDROCHLORIDE 150 MG: 37.5 CAPSULE, EXTENDED RELEASE ORAL at 07:11

## 2018-07-27 RX ADMIN — OXYCODONE AND ACETAMINOPHEN 1 TABLET: 5; 325 TABLET ORAL at 05:36

## 2018-07-27 RX ADMIN — Medication 10 ML: at 06:02

## 2018-07-27 RX ADMIN — FLUDROCORTISONE ACETATE 100 MCG: 0.1 TABLET ORAL at 08:53

## 2018-07-27 RX ADMIN — CARBIDOPA AND LEVODOPA 1 TABLET: 10; 100 TABLET ORAL at 12:43

## 2018-07-27 RX ADMIN — DULOXETINE HYDROCHLORIDE 60 MG: 60 CAPSULE, DELAYED RELEASE ORAL at 01:28

## 2018-07-27 RX ADMIN — CARBIDOPA AND LEVODOPA 1 TABLET: 10; 100 TABLET ORAL at 16:35

## 2018-07-27 RX ADMIN — Medication 10 ML: at 14:43

## 2018-07-27 RX ADMIN — SODIUM CHLORIDE, SODIUM LACTATE, POTASSIUM CHLORIDE, CALCIUM CHLORIDE, AND DEXTROSE MONOHYDRATE 100 ML/HR: 600; 310; 30; 20; 5 INJECTION, SOLUTION INTRAVENOUS at 06:02

## 2018-07-27 RX ADMIN — BACITRACIN: 500 OINTMENT TOPICAL at 09:00

## 2018-07-27 RX ADMIN — LEVOTHYROXINE SODIUM 224 MCG: 112 TABLET ORAL at 07:11

## 2018-07-27 RX ADMIN — Medication 10 ML: at 01:28

## 2018-07-27 NOTE — PROGRESS NOTES
TRANSFER - IN REPORT:    Verbal report received from Harvey Barrios RN(name) on Abelino Hummel  being received from [facility]) for routine post - op      Report consisted of patients Situation, Background, Assessment and   Recommendations(SBAR). Information from the following report(s) Procedure Summary was reviewed with the receiving nurse. Opportunity for questions and clarification was provided. Assessment completed upon patients arrival to unit and care assumed.

## 2018-07-27 NOTE — PROGRESS NOTES
Physical Therapy Screening: 
Services are not indicated at this time. [facility] based on results obtained during the nursing admission assessment. The patients chart was reviewed and the patient is not appropriate for a skilled therapy evaluation at this time. Please consult physical therapy if any therapy needs arise. Thank you.  
 
Vernon Lloyd, PT

## 2018-07-27 NOTE — PROGRESS NOTES
Problem: Falls - Risk of  Goal: *Absence of Falls  Document Dayron Fall Risk and appropriate interventions in the flowsheet.    Outcome: Progressing Towards Goal  Fall Risk Interventions:  Mobility Interventions: Patient to call before getting OOB, Utilize walker, cane, or other assistive device         Medication Interventions: Patient to call before getting OOB         History of Falls Interventions: Evaluate medications/consider consulting pharmacy, Door open when patient unattended, Room close to nurse's station

## 2018-07-27 NOTE — PROGRESS NOTES
I have reviewed discharge instructions with the Inell Breonna (Staff from [facility] ([phone]). Made Brother Eboni Tom aware of F/U appt that needs to be scheduled for Tues 7/31 with Dr Dorian Avery [phone] both  verbalized understanding.( I was unable to review DC instructions with patient due to pt's statement, \"I don't take care of any of this, you need to tell the people where I stay\")

## 2018-07-27 NOTE — ANESTHESIA POSTPROCEDURE EVALUATION
Post-Anesthesia Evaluation and Assessment    Patient: Kathia Bagely MRN: [id number]  SSN: [id number]    YOB: 1945  Age: 71 y.o. Sex: male       Cardiovascular Function/Vital Signs  Visit Vitals    /72 (BP 1 Location: Right arm, BP Patient Position: At rest)    Pulse 64    Temp 37 °C (98.6 °F)    Resp 18    Ht 6' 2\" (1.88 m)    Wt 85 kg (187 lb 8 oz)    SpO2 99%    BMI 24.07 kg/m2       Patient is status post general anesthesia for Procedure(s):  SUBTOTAL RHINECTOMY WITH FOREHEAD FLAP AND NASAL RECONSTRUCTION; REPAIR RIGHT NECK WOUND, LEFT CHEEK WOUND, AND LEFT EAR WOUND. Nausea/Vomiting: None    Postoperative hydration reviewed and adequate. Pain:  Pain Scale 1: Numeric (0 - 10) (07/27/18 0016)  Pain Intensity 1: 0 (07/27/18 0016)   Managed    Neurological Status:   Neuro (WDL): Exceptions to WDL (07/26/18 2100)  Neuro  Neurologic State: Alert; Appropriate for age (07/26/18 2309)  LUE Motor Response: Tremorous (07/26/18 2309)  RUE Motor Response: Tremorous (07/26/18 2309)   At baseline    Mental Status and Level of Consciousness: Arousable    Pulmonary Status:   O2 Device: Nasal cannula (07/26/18 2100)   Adequate oxygenation and airway patent    Complications related to anesthesia: None    Post-anesthesia assessment completed.  No concerns    Signed By: Ashleigh Laughlin MD     July 27, 2018

## 2018-07-27 NOTE — PROGRESS NOTES
Care Management - Received call from Brandy Zuniga. Sent referral to [facility] via [employer] for 6:00 PM transport. They cannot transport until 9:00 PM. Called [facility] and spoke with Nathalie Jasso. They can transport patient at 6:00 PM. Canceled [facility] via [employer] [facility]. Placed updated ambulance form on the chart. MARII Riley 
 
17:04 Addendum: No note in chart from [facility]. Called [facility] and spoke with Meryl Noel. He said he would cancel the 9:00 PM transport.   
MARII Riley

## 2018-07-27 NOTE — BRIEF OP NOTE
BRIEF OPERATIVE NOTE    Date of Procedure: 7/26/2018   Preoperative Diagnosis: SCC (squamous cell carcinoma), face [C44.320]  Postoperative Diagnosis: SCC (squamous cell carcinoma), face [C44.320]    Procedure(s):  SUBTOTAL RHINECTOMY WITH FOREHEAD FLAP AND NASAL RECONSTRUCTION; REPAIR RIGHT NECK WOUND, LEFT CHEEK WOUND, AND LEFT EAR WOUND  Surgeon(s) and Role:     * Narinder Jimenez MD - Primary         Surgical Assistant: 0    Surgical Staff:  Circ-1: Little Caballero  Circ-2: Robbie Cevallos  Circ-Relief: Benjamin Espinal RN  Scrub RN-Relief: Sarmad Newell  Event Time In   Incision Start 1814   Incision Close 2001     Anesthesia: General   Estimated Blood Loss: 30ml  Specimens:   ID Type Source Tests Collected by Time Destination   1 : nasal tip skin cancer basal cell Frozen Section Nose  Narinder Jimenez MD 7/26/2018 1825 Pathology      Findings: neg margins   Complications: 0  Implants: * No implants in log *

## 2018-07-27 NOTE — PROGRESS NOTES
CM informed of patient discharge today. Patient is from [facility]. CM called the facility [phone] and spoke with Jaqueline ([facility]). CM faxed medical record updates to [phone]. Assigned nurse Kevin Velez RN) to call report to [phone].   [facility] to transport patient at the earliest [address] [facility] YSOHI Carranza, CRM

## 2018-07-27 NOTE — PROGRESS NOTES
Otolaryngology, Head and Neck Surgery The patient is post operative day 1 from forehead flap and nasal reconstruction. he is doing well and is tolerating a diet and the pain is under control. The patient denies any chest pain or shortness of breath. Hospital Problems  Date Reviewed: 7/26/2018 Codes Class Noted POA Basal cell carcinoma (BCC) of supratip of nose ICD-10-CM: C44.311 ICD-9-CM: 173.31  7/26/2018 Unknown Current Facility-Administered Medications Medication Dose Route Frequency  0.9% sodium chloride infusion  25 mL/hr IntraVENous CONTINUOUS  
 dextrose 5% lactated ringers infusion  100 mL/hr IntraVENous CONTINUOUS  
 sodium chloride (NS) flush 5-10 mL  5-10 mL IntraVENous PRN  
 bacitracin (BACITRACIN) 500 unit/gram ointment   Topical BID  carbidopa-levodopa (SINEMET)  mg per tablet 1 Tab  1 Tab Oral TID  DULoxetine (CYMBALTA) capsule 60 mg  60 mg Oral QHS  fludrocortisone (FLORINEF) tablet 100 mcg  100 mcg Oral DAILY  levothyroxine (SYNTHROID) tablet 224 mcg  224 mcg Oral ACB  acetaminophen (TYLENOL) tablet 650 mg  650 mg Oral Q6H PRN  
 diphenhydrAMINE (BENADRYL) capsule 25 mg  25 mg Oral Q4H PRN  
 sodium chloride (NS) flush 5-10 mL  5-10 mL IntraVENous Q8H  
 sodium chloride (NS) flush 5-10 mL  5-10 mL IntraVENous PRN  
 oxyCODONE-acetaminophen (PERCOCET) 5-325 mg per tablet 1 Tab  1 Tab Oral Q4H PRN  
 morphine (PF) 1 mg/mL injection 2 mg  2 mg IntraVENous Q2H PRN  
 venlafaxine-SR (EFFEXOR-XR) capsule 150 mg  150 mg Oral DAILY WITH BREAKFAST No results found for this or any previous visit (from the past 24 hour(s)). Visit Vitals  /73 (BP 1 Location: Right arm, BP Patient Position: At rest)  Pulse 65  Temp 98 °F (36.7 °C)  Resp 18  Ht 6' 2\" (1.88 m)  Wt 85 kg (187 lb 8 oz)  SpO2 97%  BMI 24.07 kg/m2 Intake/Output Summary (Last 24 hours) at 07/27/18 1353 Last data filed at 07/27/18 1117 Gross per 24 hour Intake             2407 ml Output              255 ml Net             2152 ml The patient is a well developed, well nourished adult in no distress Neck: incision intact, no swelling or increased erythema or induration Viable forehead flap. Debrided. Chest: Clear to auscultation bilaterally. No wheezes or crackles Cardiovascular: Regular rate and rhythm Lower extremities: no calf tenderness A:  
Hospital Problems  Date Reviewed: 7/26/2018 Codes Class Noted POA Basal cell carcinoma (BCC) of supratip of nose ICD-10-CM: C44.311 ICD-9-CM: 173.31  7/26/2018 Unknown Post op day 1 from facial and nasal reconstructions. Plan: D/c to care facility Discussed wound care Plannign f/u Tues

## 2018-07-27 NOTE — PERIOP NOTES
Patient: Abelino Hummel MRN: [id number]  SSN: [id number]   YOB: 1945  Age: 71 y.o. Sex: male     Patient is status post Procedure(s):  SUBTOTAL RHINECTOMY WITH FOREHEAD FLAP AND NASAL RECONSTRUCTION; REPAIR RIGHT NECK WOUND, LEFT CHEEK WOUND, AND LEFT EAR WOUND. Surgeon(s) and Role:     * Annika Baker MD - Primary    Local/Dose/Irrigation:  17 mL 1% lidocaine with epi                  Peripheral IV 07/26/18 Left Forearm (Active)   Site Assessment Clean, dry, & intact 7/26/2018  4:03 PM   Phlebitis Assessment 0 7/26/2018  4:03 PM   Infiltration Assessment 0 7/26/2018  4:03 PM   Dressing Status Clean, dry, & intact 7/26/2018  4:03 PM   Dressing Type Transparent 7/26/2018  4:03 PM   Hub Color/Line Status Green 7/26/2018  4:03 PM   Alcohol Cap Used Yes 7/26/2018  4:03 PM            Airway - Endotracheal Tube 07/26/18 Oral (Active)                   Dressing/Packing:  Wound Nose-DRESSING TYPE: Other (Comment); Xeroform (bacitracin) (07/26/18 2001)  Wound Face-DRESSING TYPE: Oil emulsion;Non-adherent (adaptic) (07/26/18 2001)  Wound Neck Right-DRESSING TYPE: Open to air (07/1945)  Wound Cheek Left-DRESSING TYPE: Open to air (07/1945)  Wound Ear Left-DRESSING TYPE: Open to air (07/1945)  Splint/Cast:  ]    Other:  Various wounds present, see pre-op note

## 2018-07-27 NOTE — PROGRESS NOTES
Primary Nurse Kade Campoverde and STANISLAW Polk performed a dual skin assessment on this patient Impairment noted- see wound doc flow sheet: Dragan score is 17

## 2018-07-27 NOTE — PERIOP NOTES
TRANSFER - OUT REPORT:    Verbal report given to [address], RN(name) on Louis Chacon  being transferred to [facility](unit) for routine post - op       Report consisted of patients Situation, Background, Assessment and   Recommendations(SBAR). Time Pre op antibiotic given:1750  Anesthesia Stop time: 2019  Discharge Prescriptions with Chart:yes x 2    Information from the following report(s) SBAR, OR Summary, Intake/Output, MAR and Cardiac Rhythm NSR. was reviewed with the receiving nurse. Opportunity for questions and clarification was provided. Is the patient on 02? YES       L/Min 2    Is the patient on a monitor? NO    Is the nurse transporting with the patient? YES    Surgical Waiting Area notified of patient's transfer from PACU? YES      The following personal items collected during your admission accompanied patient upon transfer:   Dental Appliance: Dental Appliances: None  Vision:    Hearing Aid:    Jewelry: Jewelry: Ring (with family)  Clothing: Clothing: With patient  Other Valuables:  Other Valuables: Lawson Earing, With patient  Valuables sent to safe:

## 2018-08-19 NOTE — OP NOTES
[facility]  OPERATIVE REPORT    Mariana Rodríguez  MR#: [id number]  : 1945  ACCOUNT #: [de-identified]   DATE OF SERVICE: 2018    PREOPERATIVE DIAGNOSES:    1.  Basal cell carcinoma of the nasal tip dorsum and sidewall. 2.  Basal cell carcinoma wound defect of the left earlobe:  1.8 cm. 3.  Left mid cheek squamous cell carcinoma wound defect:  22 mm. 4.  Squamous cell carcinoma wound defect of the right neck:  3+ cm. POSTOPERATIVE DIAGNOSES:    1.  Basal cell carcinoma of the nasal tip dorsum and sidewall. 2.  Basal cell carcinoma wound defect of the left earlobe:  1.8 cm. 3.  Left mid cheek squamous cell carcinoma wound defect:  22 mm. 4.  Squamous cell carcinoma wound defect of the right neck:  3+ cm. PROCEDURES PERFORMED:    1. Subtotal rhinectomy. 2.  Paramedian forehead flap nasal reconstruction. 3.  Nasal vestibular stenosis repair. 4.  Bilateral scalp advancement flaps 8 x 11 cm plus. 5.  Rhombic flap, rotation advancement cheek reconstruction:  3 x 4 cm. 6.  Rhombic flap, rotation advancement of right neck:  3 x 5 cm.  7.  O-to-S flap of left ear:  2 x 2.5 cm.  8.  Wound debridement for reconstruction of wound sites. SURGEON:  Veronica Goss MD    ASSISTANT:  .     ANESTHESIA:  General endotracheal tube anesthesia. ESTIMATED BLOOD LOSS:  50 mL. IMPLANTS:  No implant. COMPLICATIONS:  No complications. SPECIMENS REMOVED:  Subtotal rhinectomy. INDICATIONS AND FINDINGS:  The patient had previous excisions of left ear basal, left cheek squamous and right neck squamous cell carcinomas obtaining negative margins. The patient had extensive infiltrative basal cell carcinoma of the nose requiring rhinectomy and hence performing this in the operating room, obtaining negative margins by frozen section prior to reconstruction.   Because of loss of nasal tip structures including nostril, forehead flap, nasal reconstruction was required to restore form and function of the nose and hence indications. Flap reconstruction of the forehead flap donor site was required by bilateral advancement flaps because of full thickness flap from the forehead. DETAILS OF PROCEDURES:  In the operating room, the patient was induced under general endotracheal tube anesthesia following which he was prepped and draped in a sterile fashion. 1% lidocaine with 1:100,000 epinephrine was used for local infiltration at all sites. First, with the left ear, the wound was debrided with a Kitner and 15 blade and tenotomies to obtain healthy sharp margins for reconstruction. An O-to-S flap was performed undermining the skin widely over the entire remaining lobe and performing an O-to-S flap, insetting the flap in the deep subcutaneous plane with 5-0 Vicryl. Superior and inferior Burow's triangles were excised to prevent standing cone deformity and the skin was then closed with interrupted 5-0 plain gut suture. For the left cheek and the right neck, rhombic flaps were designed with classic 60 and 120 degree flaps with the orientation in the relaxed skin tension lines and allowing the greatest tension points to be made to span the lines of maximum extensibility. The skin at both sites was widely undermined with a 15 blade, tenotomy and Metzenbaum scissors over an area of approximately 6 x 6 cm on both sites. The wounds were created into a rhombic shape excising Burow's triangles to prevent standing cone deformities. The flaps were then interposed and inset with 4-0 Vicryl deep using interrupted 4-0 plain gut sutures along the entire flap margins at the rest of the site. Antibiotic ointment was then applied to the areas and compression dressings applied. At the completion of the procedure>Dimensions as above. The subtotal rhinectomy was next performed marking out 5+ mm margins around the right nasal tip basal cell carcinoma.   Dissection was performed with tenotomies and Bovie cautery elevating a submucoperichondrial flap from the dome cartilage and the upper lateral cartilage, excising across to the nasal tip and soft tissue triangle and removing the right nostril as part of the specimen, orientating this with multiple sutures and handing it off for pathologic analysis returning with negative margins. The wound defect included the right nasal sidewall, nostril and nasal tip and supratip dorsum. Because of this wound defect disfiguring with a great risk of nasal obstruction without adequate 3 layer reconstruction, a forehead flap reconstruction was planned. A template was created over the wound defect and placed on the forehead. The right supratrochlear vessels were planned as the pedicle for the flap itself. A 1 cm pedicle width was marked out and the flap marked and incised dissecting in the immediate subcutaneous plane over the forehead flap template itself and diving down to a subgaleal plane through the pedicle itself to the supratrochlear brow area. The flap was then interpolated and inset into the wound defect in 2 layers with 5-0 Vicryl deep and interrupted 5-0 plain gut sutures in the skin. For the nostril reconstruction, the flap was rolled over itself to create the inner lining of the nasal vestibule closing with 5-0 chromic gut to the residual nasal vestibular skin. The flap was mattressed to close the dead space given the fold over with mattress sutures of 5-0 plain gut. The donor site was then addressed undermining the forehead. The entire width from temporal line to temporal line right and left, elevating the entire forehead and scalp in a subgaleal plane breaking the temporal line with Metzenbaum scissors and creating multiple vertical galeotomies to allow mechanical creep, advancing each flap, the entire width of the forehead markedly putting stress on the flaps with towel clamps to effect mechanical creep.   The donor site was closed in this manner using 2-0 Vicryl to close the galea plane using a combination of interrupted and running locked 4-0 plain gut sutures in the skin everting edges. The wounds were then cleaned and Xeroform applied to the raw surfaces. Hemostatic agents were placed in the forehead donor site and raw surface of the forehead flap itself. Antibiotic ointment was similarly applied and Adaptic used on the forehead wound and the patient was then handed over to anesthesia for awakening and transferred to the recovery room.       MD KATIE Brooks / SARA  D: 08/19/2018 18:04     T: 08/19/2018 18:49  JOB #: [id number]  CC: [facility]

## 2018-08-20 ENCOUNTER — ANESTHESIA EVENT (OUTPATIENT)
Dept: MEDSURG UNIT | Age: 73
End: 2018-08-20
Payer: MEDICARE

## 2018-08-20 ENCOUNTER — ANESTHESIA (OUTPATIENT)
Dept: MEDSURG UNIT | Age: 73
End: 2018-08-20
Payer: MEDICARE

## 2018-08-20 ENCOUNTER — HOSPITAL ENCOUNTER (OUTPATIENT)
Age: 73
Setting detail: OUTPATIENT SURGERY
Discharge: HOME | End: 2018-08-20
Attending: OTOLARYNGOLOGY | Admitting: OTOLARYNGOLOGY
Payer: MEDICARE

## 2018-08-20 VITALS
HEIGHT: 74 IN | SYSTOLIC BLOOD PRESSURE: 172 MMHG | HEART RATE: 57 BPM | WEIGHT: 187 LBS | DIASTOLIC BLOOD PRESSURE: 90 MMHG | TEMPERATURE: 97.5 F | OXYGEN SATURATION: 97 % | RESPIRATION RATE: 16 BRPM | BODY MASS INDEX: 24 KG/M2

## 2018-08-20 DIAGNOSIS — C44.311 BASAL CELL CARCINOMA (BCC) OF SUPRATIP OF NOSE: Primary | ICD-10-CM

## 2018-08-20 LAB — HGB BLD-MCNC: 12.8 G/DL (ref 12.1–17)

## 2018-08-20 PROCEDURE — 77030020782 HC GWN BAIR PAWS FLX 3M -B

## 2018-08-20 PROCEDURE — 74011250636 HC RX REV CODE- 250/636: Performed by: ANESTHESIOLOGY

## 2018-08-20 PROCEDURE — 88305 TISSUE EXAM BY PATHOLOGIST: CPT | Performed by: OTOLARYNGOLOGY

## 2018-08-20 PROCEDURE — 74011250636 HC RX REV CODE- 250/636

## 2018-08-20 PROCEDURE — 76060000063 HC AMB SURG ANES 1.5 TO 2 HR: Performed by: OTOLARYNGOLOGY

## 2018-08-20 PROCEDURE — 74011000250 HC RX REV CODE- 250

## 2018-08-20 PROCEDURE — 76210000056 HC AMBSU PH II REC 1.5 TO 2 HR: Performed by: OTOLARYNGOLOGY

## 2018-08-20 PROCEDURE — 74011000250 HC RX REV CODE- 250: Performed by: OTOLARYNGOLOGY

## 2018-08-20 PROCEDURE — 77030032490 HC SLV COMPR SCD KNE COVD -B: Performed by: OTOLARYNGOLOGY

## 2018-08-20 PROCEDURE — 77030011640 HC PAD GRND REM COVD -A: Performed by: OTOLARYNGOLOGY

## 2018-08-20 PROCEDURE — 77030002974 HC SUT PLN J&J -A: Performed by: OTOLARYNGOLOGY

## 2018-08-20 PROCEDURE — 74011250637 HC RX REV CODE- 250/637: Performed by: OTOLARYNGOLOGY

## 2018-08-20 PROCEDURE — 77030009408 HC ELECTRD LP RND UTMD -B: Performed by: OTOLARYNGOLOGY

## 2018-08-20 PROCEDURE — 76210000035 HC AMBSU PH I REC 1 TO 1.5 HR: Performed by: OTOLARYNGOLOGY

## 2018-08-20 PROCEDURE — 85018 HEMOGLOBIN: CPT

## 2018-08-20 PROCEDURE — 77030018724 HC ELCTRD LP RND UTMD -B: Performed by: OTOLARYNGOLOGY

## 2018-08-20 PROCEDURE — 76030000003 HC AMB SURG OR TIME 1.5 TO 2: Performed by: OTOLARYNGOLOGY

## 2018-08-20 PROCEDURE — 77030031139 HC SUT VCRL2 J&J -A: Performed by: OTOLARYNGOLOGY

## 2018-08-20 PROCEDURE — 77030018836 HC SOL IRR NACL ICUM -A: Performed by: OTOLARYNGOLOGY

## 2018-08-20 PROCEDURE — 88331 PATH CONSLTJ SURG 1 BLK 1SPC: CPT | Performed by: OTOLARYNGOLOGY

## 2018-08-20 RX ORDER — SODIUM CHLORIDE 0.9 % (FLUSH) 0.9 %
5-10 SYRINGE (ML) INJECTION EVERY 8 HOURS
Status: CANCELLED | OUTPATIENT
Start: 2018-08-20

## 2018-08-20 RX ORDER — CEFAZOLIN SODIUM 2 G/50ML
2 SOLUTION INTRAVENOUS ONCE
Status: CANCELLED | OUTPATIENT
Start: 2018-08-20 | End: 2018-08-20

## 2018-08-20 RX ORDER — FENTANYL CITRATE 50 UG/ML
25 INJECTION, SOLUTION INTRAMUSCULAR; INTRAVENOUS
Status: DISCONTINUED | OUTPATIENT
Start: 2018-08-20 | End: 2018-08-20 | Stop reason: HOSPADM

## 2018-08-20 RX ORDER — OXYCODONE AND ACETAMINOPHEN 5; 325 MG/1; MG/1
1 TABLET ORAL AS NEEDED
Status: DISCONTINUED | OUTPATIENT
Start: 2018-08-20 | End: 2018-08-20 | Stop reason: HOSPADM

## 2018-08-20 RX ORDER — ONDANSETRON 2 MG/ML
4 INJECTION INTRAMUSCULAR; INTRAVENOUS AS NEEDED
Status: DISCONTINUED | OUTPATIENT
Start: 2018-08-20 | End: 2018-08-20 | Stop reason: HOSPADM

## 2018-08-20 RX ORDER — SODIUM CHLORIDE 0.9 % (FLUSH) 0.9 %
5-10 SYRINGE (ML) INJECTION AS NEEDED
Status: DISCONTINUED | OUTPATIENT
Start: 2018-08-20 | End: 2018-08-20 | Stop reason: HOSPADM

## 2018-08-20 RX ORDER — SODIUM CHLORIDE 0.9 % (FLUSH) 0.9 %
5-10 SYRINGE (ML) INJECTION EVERY 8 HOURS
Status: DISCONTINUED | OUTPATIENT
Start: 2018-08-20 | End: 2018-08-20 | Stop reason: HOSPADM

## 2018-08-20 RX ORDER — BACITRACIN 500 [USP'U]/G
OINTMENT TOPICAL AS NEEDED
Status: DISCONTINUED | OUTPATIENT
Start: 2018-08-20 | End: 2018-08-20 | Stop reason: HOSPADM

## 2018-08-20 RX ORDER — LIDOCAINE HYDROCHLORIDE AND EPINEPHRINE 10; 10 MG/ML; UG/ML
INJECTION, SOLUTION INFILTRATION; PERINEURAL AS NEEDED
Status: DISCONTINUED | OUTPATIENT
Start: 2018-08-20 | End: 2018-08-20 | Stop reason: HOSPADM

## 2018-08-20 RX ORDER — PROPOFOL 10 MG/ML
INJECTION, EMULSION INTRAVENOUS
Status: DISCONTINUED | OUTPATIENT
Start: 2018-08-20 | End: 2018-08-20 | Stop reason: HOSPADM

## 2018-08-20 RX ORDER — DIPHENHYDRAMINE HYDROCHLORIDE 50 MG/ML
12.5 INJECTION, SOLUTION INTRAMUSCULAR; INTRAVENOUS AS NEEDED
Status: DISCONTINUED | OUTPATIENT
Start: 2018-08-20 | End: 2018-08-20 | Stop reason: HOSPADM

## 2018-08-20 RX ORDER — SODIUM CHLORIDE, SODIUM LACTATE, POTASSIUM CHLORIDE, CALCIUM CHLORIDE 600; 310; 30; 20 MG/100ML; MG/100ML; MG/100ML; MG/100ML
125 INJECTION, SOLUTION INTRAVENOUS CONTINUOUS
Status: DISCONTINUED | OUTPATIENT
Start: 2018-08-20 | End: 2018-08-20 | Stop reason: HOSPADM

## 2018-08-20 RX ORDER — MIDAZOLAM HYDROCHLORIDE 1 MG/ML
0.5 INJECTION, SOLUTION INTRAMUSCULAR; INTRAVENOUS
Status: DISCONTINUED | OUTPATIENT
Start: 2018-08-20 | End: 2018-08-20 | Stop reason: HOSPADM

## 2018-08-20 RX ORDER — SODIUM CHLORIDE 9 MG/ML
50 INJECTION, SOLUTION INTRAVENOUS CONTINUOUS
Status: DISCONTINUED | OUTPATIENT
Start: 2018-08-20 | End: 2018-08-20 | Stop reason: HOSPADM

## 2018-08-20 RX ORDER — HYDROCODONE BITARTRATE AND ACETAMINOPHEN 5; 325 MG/1; MG/1
2 TABLET ORAL
Qty: 30 TAB | Refills: 0 | Status: SHIPPED | OUTPATIENT
Start: 2018-08-20 | End: 2019-05-09

## 2018-08-20 RX ORDER — DEXTROSE, SODIUM CHLORIDE, SODIUM LACTATE, POTASSIUM CHLORIDE, AND CALCIUM CHLORIDE 5; .6; .31; .03; .02 G/100ML; G/100ML; G/100ML; G/100ML; G/100ML
100 INJECTION, SOLUTION INTRAVENOUS CONTINUOUS
Status: DISCONTINUED | OUTPATIENT
Start: 2018-08-20 | End: 2018-08-20 | Stop reason: HOSPADM

## 2018-08-20 RX ORDER — PHENYLEPHRINE HCL IN 0.9% NACL 0.4MG/10ML
SYRINGE (ML) INTRAVENOUS AS NEEDED
Status: DISCONTINUED | OUTPATIENT
Start: 2018-08-20 | End: 2018-08-20 | Stop reason: HOSPADM

## 2018-08-20 RX ORDER — MORPHINE SULFATE 10 MG/ML
2 INJECTION, SOLUTION INTRAMUSCULAR; INTRAVENOUS
Status: DISCONTINUED | OUTPATIENT
Start: 2018-08-20 | End: 2018-08-20 | Stop reason: HOSPADM

## 2018-08-20 RX ORDER — LIDOCAINE HYDROCHLORIDE 10 MG/ML
0.1 INJECTION, SOLUTION EPIDURAL; INFILTRATION; INTRACAUDAL; PERINEURAL AS NEEDED
Status: DISCONTINUED | OUTPATIENT
Start: 2018-08-20 | End: 2018-08-20 | Stop reason: HOSPADM

## 2018-08-20 RX ORDER — MIDAZOLAM HYDROCHLORIDE 1 MG/ML
INJECTION, SOLUTION INTRAMUSCULAR; INTRAVENOUS AS NEEDED
Status: DISCONTINUED | OUTPATIENT
Start: 2018-08-20 | End: 2018-08-20 | Stop reason: HOSPADM

## 2018-08-20 RX ORDER — OXYCODONE AND ACETAMINOPHEN 5; 325 MG/1; MG/1
1 TABLET ORAL
Status: CANCELLED | OUTPATIENT
Start: 2018-08-20

## 2018-08-20 RX ORDER — CEFAZOLIN SODIUM IN 0.9 % NACL 2 G/100 ML
PLASTIC BAG, INJECTION (ML) INTRAVENOUS AS NEEDED
Status: DISCONTINUED | OUTPATIENT
Start: 2018-08-20 | End: 2018-08-20 | Stop reason: HOSPADM

## 2018-08-20 RX ORDER — DEXMEDETOMIDINE HYDROCHLORIDE 4 UG/ML
INJECTION, SOLUTION INTRAVENOUS AS NEEDED
Status: DISCONTINUED | OUTPATIENT
Start: 2018-08-20 | End: 2018-08-20 | Stop reason: HOSPADM

## 2018-08-20 RX ORDER — SODIUM CHLORIDE 9 MG/ML
1000 INJECTION, SOLUTION INTRAVENOUS CONTINUOUS
Status: DISCONTINUED | OUTPATIENT
Start: 2018-08-20 | End: 2018-08-20 | Stop reason: HOSPADM

## 2018-08-20 RX ORDER — HYDROCODONE BITARTRATE AND ACETAMINOPHEN 5; 325 MG/1; MG/1
1 TABLET ORAL
Status: COMPLETED | OUTPATIENT
Start: 2018-08-20 | End: 2018-08-20

## 2018-08-20 RX ORDER — MIDAZOLAM HYDROCHLORIDE 1 MG/ML
1 INJECTION, SOLUTION INTRAMUSCULAR; INTRAVENOUS AS NEEDED
Status: DISCONTINUED | OUTPATIENT
Start: 2018-08-20 | End: 2018-08-20 | Stop reason: HOSPADM

## 2018-08-20 RX ORDER — MORPHINE SULFATE 2 MG/ML
2 INJECTION, SOLUTION INTRAMUSCULAR; INTRAVENOUS
Status: CANCELLED | OUTPATIENT
Start: 2018-08-20

## 2018-08-20 RX ORDER — PROPOFOL 10 MG/ML
INJECTION, EMULSION INTRAVENOUS AS NEEDED
Status: DISCONTINUED | OUTPATIENT
Start: 2018-08-20 | End: 2018-08-20 | Stop reason: HOSPADM

## 2018-08-20 RX ORDER — FENTANYL CITRATE 50 UG/ML
50 INJECTION, SOLUTION INTRAMUSCULAR; INTRAVENOUS AS NEEDED
Status: DISCONTINUED | OUTPATIENT
Start: 2018-08-20 | End: 2018-08-20 | Stop reason: HOSPADM

## 2018-08-20 RX ORDER — SODIUM CHLORIDE 0.9 % (FLUSH) 0.9 %
5-10 SYRINGE (ML) INJECTION AS NEEDED
Status: CANCELLED | OUTPATIENT
Start: 2018-08-20

## 2018-08-20 RX ORDER — BACITRACIN ZINC 500 UNIT/G
OINTMENT (GRAM) TOPICAL 2 TIMES DAILY
Qty: 15 G | Refills: 0 | Status: SHIPPED | OUTPATIENT
Start: 2018-08-20 | End: 2019-05-09

## 2018-08-20 RX ADMIN — HYDROCODONE BITARTRATE AND ACETAMINOPHEN 1 TABLET: 5; 325 TABLET ORAL at 10:40

## 2018-08-20 RX ADMIN — DEXMEDETOMIDINE HYDROCHLORIDE 4 MCG: 4 INJECTION, SOLUTION INTRAVENOUS at 08:29

## 2018-08-20 RX ADMIN — PROPOFOL 25 MG: 10 INJECTION, EMULSION INTRAVENOUS at 08:36

## 2018-08-20 RX ADMIN — DEXMEDETOMIDINE HYDROCHLORIDE 2 MCG: 4 INJECTION, SOLUTION INTRAVENOUS at 09:10

## 2018-08-20 RX ADMIN — SODIUM CHLORIDE, POTASSIUM CHLORIDE, SODIUM LACTATE AND CALCIUM CHLORIDE: 600; 310; 30; 20 INJECTION, SOLUTION INTRAVENOUS at 08:15

## 2018-08-20 RX ADMIN — PROPOFOL 25 MG: 10 INJECTION, EMULSION INTRAVENOUS at 08:34

## 2018-08-20 RX ADMIN — PROPOFOL 25 MG: 10 INJECTION, EMULSION INTRAVENOUS at 09:05

## 2018-08-20 RX ADMIN — DEXMEDETOMIDINE HYDROCHLORIDE 4 MCG: 4 INJECTION, SOLUTION INTRAVENOUS at 09:09

## 2018-08-20 RX ADMIN — PROPOFOL 25 MG: 10 INJECTION, EMULSION INTRAVENOUS at 09:07

## 2018-08-20 RX ADMIN — PROPOFOL 25 MG: 10 INJECTION, EMULSION INTRAVENOUS at 08:44

## 2018-08-20 RX ADMIN — DEXMEDETOMIDINE HYDROCHLORIDE 4 MCG: 4 INJECTION, SOLUTION INTRAVENOUS at 08:24

## 2018-08-20 RX ADMIN — DEXMEDETOMIDINE HYDROCHLORIDE 4 MCG: 4 INJECTION, SOLUTION INTRAVENOUS at 08:31

## 2018-08-20 RX ADMIN — PROPOFOL 50 MCG/KG/MIN: 10 INJECTION, EMULSION INTRAVENOUS at 08:32

## 2018-08-20 RX ADMIN — DEXMEDETOMIDINE HYDROCHLORIDE 4 MCG: 4 INJECTION, SOLUTION INTRAVENOUS at 08:26

## 2018-08-20 RX ADMIN — Medication 120 MCG: at 09:02

## 2018-08-20 RX ADMIN — Medication 2 G: at 08:34

## 2018-08-20 RX ADMIN — MIDAZOLAM HYDROCHLORIDE 3 MG: 1 INJECTION, SOLUTION INTRAMUSCULAR; INTRAVENOUS at 08:23

## 2018-08-20 RX ADMIN — PROPOFOL 25 MG: 10 INJECTION, EMULSION INTRAVENOUS at 08:32

## 2018-08-20 RX ADMIN — DEXMEDETOMIDINE HYDROCHLORIDE 4 MCG: 4 INJECTION, SOLUTION INTRAVENOUS at 09:11

## 2018-08-20 RX ADMIN — Medication 120 MCG: at 09:18

## 2018-08-20 RX ADMIN — DEXMEDETOMIDINE HYDROCHLORIDE 4 MCG: 4 INJECTION, SOLUTION INTRAVENOUS at 09:05

## 2018-08-20 RX ADMIN — DEXMEDETOMIDINE HYDROCHLORIDE 2 MCG: 4 INJECTION, SOLUTION INTRAVENOUS at 09:08

## 2018-08-20 RX ADMIN — DEXMEDETOMIDINE HYDROCHLORIDE 4 MCG: 4 INJECTION, SOLUTION INTRAVENOUS at 09:06

## 2018-08-20 RX ADMIN — DEXMEDETOMIDINE HYDROCHLORIDE 4 MCG: 4 INJECTION, SOLUTION INTRAVENOUS at 08:28

## 2018-08-20 RX ADMIN — Medication 160 MCG: at 08:54

## 2018-08-20 NOTE — IP AVS SNAPSHOT
18[address] [place] [address] 
[phone] Patient: Trung Sanches MRN: [id number] CXQ:6/84/[id number] About your hospitalization You were admitted on:  August 20, 2018 You last received care in the:  [facility] PACU You were discharged on:  August 20, 2018 Why you were hospitalized Your primary diagnosis was:  Not on File Follow-up Information Follow up With Details Comments Contact Info Jose Mcmillan MD   [address] [place] [address] [address] 
[phone] Discharge Orders None A check serg indicates which time of day the medication should be taken. My Medications CHANGE how you take these medications Instructions Each Dose to Equal  
 Morning Noon Evening Bedtime * bacitracin zinc ointment Commonly known as:  BACITRACIN What changed:  additional instructions Your last dose was: Your next dose is:    
   
   
 Apply  to affected area two (2) times a day. * bacitracin zinc ointment Commonly known as:  BACITRACIN What changed: - Another medication with the same name was added. Make sure you understand how and when to take each. - Another medication with the same name was changed. Make sure you understand how and when to take each. Your last dose was: Your next dose is:    
   
   
 Apply  to affected area two (2) times a day. * bacitracin zinc ointment Commonly known as:  BACITRACIN What changed: You were already taking a medication with the same name, and this prescription was added. Make sure you understand how and when to take each. Your last dose was: Your next dose is:    
   
   
 Apply  to affected area two (2) times a day. * HYDROcodone-acetaminophen 5-325 mg per tablet Commonly known as:  Jennifer Gonzalez  
 What changed:  Another medication with the same name was added. Make sure you understand how and when to take each. Your last dose was: Your next dose is: Take 2 Tabs by mouth every six (6) hours as needed for Pain for up to 30 doses. Max Daily Amount: 8 Tabs. Indications: Pain 2 Tab  
    
   
   
   
  
 * HYDROcodone-acetaminophen 7.5-325 mg per tablet Commonly known as:  Wayna Glaze What changed:  Another medication with the same name was added. Make sure you understand how and when to take each. Your last dose was: Your next dose is: Take 2 Tabs by mouth every six (6) hours as needed for Pain for up to 30 doses. Max Daily Amount: 8 Tabs. Indications: Pain 2 Tab  
    
   
   
   
  
 * HYDROcodone-acetaminophen 5-325 mg per tablet Commonly known as:  Wayna Glaze What changed: You were already taking a medication with the same name, and this prescription was added. Make sure you understand how and when to take each. Your last dose was: Your next dose is: Take 2 Tabs by mouth every six (6) hours as needed for Pain for up to 30 doses. Max Daily Amount: 8 Tabs. Indications: Pain 2 Tab * Notice: This list has 6 medication(s) that are the same as other medications prescribed for you. Read the directions carefully, and ask your doctor or other care provider to review them with you. CONTINUE taking these medications Instructions Each Dose to Equal  
 Morning Noon Evening Bedtime AQUAPHOR ointment Generic drug:  mineral oil-hydrophil petrolat Your last dose was: Your next dose is:    
   
   
 Apply  to affected area as needed for Skin Irritation (APPLY TO GROIN RASH BID PRN). BENADRYL 25 mg capsule Generic drug:  diphenhydrAMINE Your last dose was: Your next dose is: Take 25 mg by mouth every four (4) hours as needed. 25 mg  
    
   
   
   
  
 carbidopa-levodopa  mg per tablet Commonly known as:  SINEMET Your last dose was: Your next dose is: Take 1 Tab by mouth three (3) times daily. 1 Tab CYMBALTA 60 mg capsule Generic drug:  DULoxetine Your last dose was: Your next dose is: Take 60 mg by mouth nightly. 60 mg  
    
   
   
   
  
 EFFEXOR  mg capsule Generic drug:  venlafaxine-SR Your last dose was: Your next dose is: Take 150 mg by mouth daily. 150 mg  
    
   
   
   
  
 fludrocortisone 0.1 mg tablet Commonly known as:  FLORINEF Your last dose was: Your next dose is: Take 0.1 mg by mouth daily. 0.1 mg  
    
   
   
   
  
 LEVOTHYROXINE PO Your last dose was: Your next dose is: Take 225 mcg by mouth daily. 225 mcg TYLENOL EXTRA STRENGTH 500 mg tablet Generic drug:  acetaminophen Your last dose was: Your next dose is: Take 500 mg by mouth every six (6) hours as needed for Pain (NO MORE THAN 4000MG PER DAY). 500 mg Where to Get Your Medications These medications were sent to [address], [address] [address] Phone:  [phone]  
  bacitracin zinc ointment Information on where to get these meds will be given to you by the nurse or doctor. ! Ask your nurse or doctor about these medications HYDROcodone-acetaminophen 5-325 mg per tablet Opioid Education Prescription Opioids: What You Need to Know: 
 
Prescription opioids can be used to help relieve moderate-to-severe pain and are often prescribed following a surgery or injury, or for certain health conditions.   These medications can be an important part of treatment but also come with serious risks. Opioids are strong pain medicines. Examples include hydrocodone, oxycodone, fentanyl, and morphine. Heroin is an example of an illegal opioid. It is important to work with your health care provider to make sure you are getting the safest, most effective care. WHAT ARE THE RISKS AND SIDE EFFECTS OF OPIOID USE? Prescription opioids carry serious risks of addiction and overdose, especially with prolonged use. An opioid overdose, often marked by slow breathing, can cause sudden death. The use of prescription opioids can have a number of side effects as well, even when taken as directed. · Tolerance-meaning you might need to take more of a medication for the same pain relief · Physical dependence-meaning you have symptoms of withdrawal when the medication is stopped. Withdrawal symptoms can include nausea, sweating, chills, diarrhea, stomach cramps, and muscle aches. Withdrawal can last up to several weeks, depending on which drug you took and how long you took it. · Increased sensitivity to pain · Constipation · Nausea, vomiting, and dry mouth · Sleepiness and dizziness · Confusion · Depression · Low levels of testosterone that can result in lower sex drive, energy, and strength · Itching and sweating RISKS ARE GREATER WITH:      
· History of drug misuse, substance use disorder, or overdose · Mental health conditions (such as depression or anxiety) · Sleep apnea · Older age (72 years or older) · Pregnancy Avoid alcohol while taking prescription opioids. Also, unless specifically advised by your health care provider, medications to avoid include: · Benzodiazepines (such as Xanax or Valium) · Muscle relaxants (such as Soma or Flexeril) · Hypnotics (such as Ambien or Lunesta) · Other prescription opioids KNOW YOUR OPTIONS Talk to your health care provider about ways to manage your pain that don't involve prescription opioids. Some of these options may actually work better and have fewer risks and side effects. Options may include: 
· Pain relievers such as acetaminophen, ibuprofen, and naproxen · Some medications that are also used for depression or seizures · Physical therapy and exercise · Counseling to help patients learn how to cope better with triggers of pain and stress. · Application of heat or cold compress · Massage therapy · Relaxation techniques Be Informed Make sure you know the name of your medication, how much and how often to take it, and its potential risks & side effects. IF YOU ARE PRESCRIBED OPIOIDS FOR PAIN: 
· Never take opioids in greater amounts or more often than prescribed. Remember the goal is not to be pain-free but to manage your pain at a tolerable level. · Follow up with your primary care provider to: · Work together to create a plan on how to manage your pain. · Talk about ways to help manage your pain that don't involve prescription opioids. · Talk about any and all concerns and side effects. · Help prevent misuse and abuse. · Never sell or share prescription opioids · Help prevent misuse and abuse. · Store prescription opioids in a secure place and out of reach of others (this may include visitors, children, friends, and family). · Safely dispose of unused/unwanted prescription opioids: Find your community drug take-back program or your pharmacy mail-back program, or flush them down the toilet, following guidance from the Food and Drug Administration (www.fda.gov/Drugs/ResourcesForYou). · Visit www.cdc.gov/drugoverdose to learn about the risks of opioid abuse and overdose. · If you believe you may be struggling with addiction, tell your health care provider and ask for guidance or call [address] [place], [place] [address] at [phone]. Discharge Instructions Wound Care: After Your Visit Your Care Instructions Taking good care of your wound at home will help it heal quickly and reduce your chance of infection. The doctor has checked you carefully, but problems can develop later. If you notice any problems or new symptoms, get medical treatment right away. Follow-up care is a key part of your treatment and safety. Be sure to make and go to all appointments, and call your doctor if you are having problems. It's also a good idea to know your test results and keep a list of the medicines you take. How can you care for yourself at home? · Clean the area with soap and water 2 times a day unless your doctor gives you different instructions. Don't use hydrogen peroxide or alcohol, which can slow healing. ¨ You may cover the wound with a thin layer of antibiotic ointment, such as bacitracin, and a nonstick bandage. ¨ Apply more ointment and replace the bandage as needed. · Take pain medicines exactly as directed. Some pain is normal with a wound, but do not ignore pain that is getting worse instead of better. You could have an infection. ¨ If the doctor gave you a prescription medicine for pain, take it as prescribed. ¨ If you are not taking a prescription pain medicine, ask your doctor if you can take an over-the-counter medicine. · Your doctor may have closed your wound with stitches (sutures), staples, or skin glue. ¨ If you have stitches, your doctor may remove them after several days to 2 weeks. Or you may have stitches that dissolve on their own. ¨ If you have staples, your doctor may remove them after 7 to 10 days. ¨ If your wound was closed with skin glue, the glue will wear off in a few days to 2 weeks. When should you call for help? Call your doctor now or seek immediate medical care if: 
· You have signs of infection, such as: 
¨ Increased pain, swelling, warmth, or redness near the wound. ¨ Red streaks leading from the wound. ¨ Pus draining from the wound. ¨ A fever. · You bleed so much from your incision that you soak one or more bandages over 2 to 4 hours. Watch closely for changes in your health, and be sure to contact your doctor if: · The wound is not getting better each day. Where can you learn more? Go to Smithers Avanza.be Enter R858 in the search box to learn more about \"Wound Care: After Your Visit. \"  
© 0860-4123 Healthwise, Incorporated. Care instructions adapted under license by [facility] (which disclaims liability or warranty for this information). This care instruction is for use with your licensed healthcare professional. If you have questions about a medical condition or this instruction, always ask your healthcare professional. [address] any warranty or liability for your use of this information. Content Version: 25.0.205277; Last Revised: April 23, 2012 DISCHARGE SUMMARY from Nurse Hydrocodone 5 mg / 325 mg of Tylenol given at 10:40 am.  
 
PATIENT INSTRUCTIONS: 
 
After general anesthesia or intravenous sedation, for 24 hours or while taking prescription Narcotics: · Limit your activities · Do not drive and operate hazardous machinery · Do not make important personal or business decisions · Do  not drink alcoholic beverages · If you have not urinated within 8 hours after discharge, please contact your surgeon on call. Report the following to your surgeon: 
· Excessive pain, swelling, redness or odor of or around the surgical area · Temperature over 100.5 · Nausea and vomiting lasting longer than 4 hours or if unable to take medications · Any signs of decreased circulation or nerve impairment to extremity: change in color, persistent  numbness, tingling, coldness or increase pain · Any questions What to do at Home: 
Recommended activity: Activity as tolerated and no driving for today and No driving while on analgesics,  
 
 If you experience any of the following symptoms ; as noted above, please follow up with Rona Sensor. *  Please give a list of your current medications to your Primary Care Provider. *  Please update this list whenever your medications are discontinued, doses are 
    changed, or new medications (including over-the-counter products) are added. *  Please carry medication information at all times in case of emergency situations. These are general instructions for a healthy lifestyle: No smoking/ No tobacco products/ Avoid exposure to second hand smoke Surgeon General's Warning:  Quitting smoking now greatly reduces serious risk to your health. Obesity, smoking, and sedentary lifestyle greatly increases your risk for illness A healthy diet, regular physical exercise & weight monitoring are important for maintaining a healthy lifestyle You may be retaining fluid if you have a history of heart failure or if you experience any of the following symptoms:  Weight gain of 3 pounds or more overnight or 5 pounds in a week, increased swelling in our hands or feet or shortness of breath while lying flat in bed. Please call your doctor as soon as you notice any of these symptoms; do not wait until your next office visit. Recognize signs and symptoms of STROKE: 
 
F-face looks uneven A-arms unable to move or move unevenly S-speech slurred or non-existent T-time-call 911 as soon as signs and symptoms begin-DO NOT go Back to bed or wait to see if you get better-TIME IS BRAIN. Warning Signs of HEART ATTACK Call 911 if you have these symptoms: 
? Chest discomfort. Most heart attacks involve discomfort in the center of the chest that lasts more than a few minutes, or that goes away and comes back. It can feel like uncomfortable pressure, squeezing, fullness, or pain. ? Discomfort in other areas of the upper body.  Symptoms can include pain or discomfort in one or both arms, the back, neck, jaw, or stomach. ? Shortness of breath with or without chest discomfort. ? Other signs may include breaking out in a cold sweat, nausea, or lightheadedness. Don't wait more than five minutes to call [address]! Fast action can save your life. Calling 911 is almost always the fastest way to get lifesaving treatment. Emergency Medical Services staff can begin treatment when they arrive  up to an hour sooner than if someone gets to the hospital by car. The discharge information has been reviewed with the patient and caregiver. The patient and caregiver verbalized understanding. Discharge medications reviewed with the patient and caregiver and appropriate educational materials and side effects teaching were provided. ___________________________________________________________________________________________________________________________________ Introducing [facility]! Royce Burns introduces [facility] patient portal. Now you can access parts of your medical record, email your doctor's office, and request medication refills online. 1. In your internet browser, go to [email or web address] 2. Click on the First Time User? Click Here link in the Sign In box. You will see the New Member Sign Up page. 3. Enter your [facility] [employer] Access Code exactly as it appears below. You will not need to use this code after youve completed the sign-up process. If you do not sign up before the expiration date, you must request a new code. · [facility] Access Code: [email or web address] Expires: 10/18/2018  2:19 PM 
 
4. Enter the last four digits of your Social Security Number (xxxx) and Date of Birth (mm/dd/yyyy) as indicated and click Submit. You will be taken to the next sign-up page. 5. Create a MyChart ID. This will be your [employer] login ID and cannot be changed, so think of one that is secure and easy to remember. 6. Create a Provident Link password. You can change your password at any time. 7. Enter your Password Reset Question and Answer. This can be used at a later time if you forget your password. 8. Enter your e-mail address. You will receive e-mail notification when new information is available in [address] 9. Click Sign Up. You can now view and download portions of your medical record. 10. Click the Download Summary menu link to download a portable copy of your medical information. If you have questions, please visit the Frequently Asked Questions section of the Provident Link website. Remember, Provident Link is NOT to be used for urgent needs. For medical emergencies, dial 911. Now available from your iPhone and Android! Introducing Rg Briceno As a [facility] patient, I wanted to make you aware of our electronic visit tool called Rg Briceno. TruongZeroVM/ScribeStorm allows you to connect within minutes with a medical provider 24 hours a day, seven days a week via a mobile device or tablet or logging into a secure website from your computer. You can access Rg Briceno from anywhere in the [place]. A virtual visit might be right for you when you have a simple condition and feel like you just dont want to get out of bed, or cant get away from work for an appointment, when your regular [facility] provider is not available (evenings, weekends or holidays), or when youre out of town and need minor care. Electronic visits cost only $49 and if the TruongZeroVM/ScribeStorm provider determines a prescription is needed to treat your condition, one can be electronically transmitted to a nearby pharmacy*. Please take a moment to enroll today if you have not already done so. The enrollment process is free and takes just a few minutes. To enroll, please download the [email or web address] marco antonio to your tablet or phone, or visit [email or web address] to enroll on your computer. And, as an [address] [place] [address] patient with a StatusPage account, the results of your visits will be scanned into your electronic medical record and your primary care provider will be able to view the scanned results. We urge you to continue to see your regular [employer] provider for your ongoing medical care. And while your primary care provider may not be the one available when you seek a Rg Roquewingfin virtual visit, the peace of mind you get from getting a real diagnosis real time can be priceless. For more information on Rg Velasquezfin, view our Frequently Asked Questions (FAQs) at [email or web address]. Sincerely, 
 
Mikel Nunez MD 
Chief Medical Officer [employer] *:  certain medications cannot be prescribed via Rg Judewingfin Providers Seen During Your Hospitalization Provider Specialty Primary office phone Krista Serna MD Otolaryngology [phone] Your Primary Care Physician (PCP) Primary Care Physician Office Phone Office Fax Jey Wellington [phone] You are allergic to the following No active allergies Recent Documentation Height Weight BMI Smoking Status 1.88 m 84.8 kg 24.01 kg/m2 Never Smoker Emergency Contacts Name Discharge Info Relation Home Work Mobile Hortencia Wilsonerne DISCHARGE CAREGIVER [3] Brother [24] [phone] Patient Belongings The following personal items are in your possession at time of discharge: 
  Dental Appliances: None  Visual Aid: None   Hearing Aids/Status: Does not own         Clothing:  (clothes in locker) Discharge Instructions Attachments/References WOUND CARE (ENGLISH) Patient Handouts Wound Care: After Your Visit Your Care Instructions Taking good care of your wound at home will help it heal quickly and reduce your chance of infection. The doctor has checked you carefully, but problems can develop later. If you notice any problems or new symptoms, get medical treatment right away. Follow-up care is a key part of your treatment and safety. Be sure to make and go to all appointments, and call your doctor if you are having problems. It's also a good idea to know your test results and keep a list of the medicines you take. How can you care for yourself at home? · Clean the area with soap and water 2 times a day unless your doctor gives you different instructions. Don't use hydrogen peroxide or alcohol, which can slow healing. ¨ You may cover the wound with a thin layer of antibiotic ointment, such as bacitracin, and a nonstick bandage. ¨ Apply more ointment and replace the bandage as needed. · Take pain medicines exactly as directed. Some pain is normal with a wound, but do not ignore pain that is getting worse instead of better. You could have an infection. ¨ If the doctor gave you a prescription medicine for pain, take it as prescribed. ¨ If you are not taking a prescription pain medicine, ask your doctor if you can take an over-the-counter medicine. · Your doctor may have closed your wound with stitches (sutures), staples, or skin glue. ¨ If you have stitches, your doctor may remove them after several days to 2 weeks. Or you may have stitches that dissolve on their own. ¨ If you have staples, your doctor may remove them after 7 to 10 days. ¨ If your wound was closed with skin glue, the glue will wear off in a few days to 2 weeks. When should you call for help? Call your doctor now or seek immediate medical care if: 
· You have signs of infection, such as: 
¨ Increased pain, swelling, warmth, or redness near the wound. ¨ Red streaks leading from the wound. ¨ Pus draining from the wound. ¨ A fever. · You bleed so much from your incision that you soak one or more bandages over 2 to 4 hours. Watch closely for changes in your health, and be sure to contact your doctor if: · The wound is not getting better each day. Where can you learn more? Go to DealExplorer.be Enter P737 in the search box to learn more about \"Wound Care: After Your Visit. \"  
© 4463-1182 [employer], [employer]. Care instructions adapted under license by [employer] (which disclaims liability or warranty for this information). This care instruction is for use with your licensed healthcare professional. If you have questions about a medical condition or this instruction, always ask your healthcare professional. [address] any warranty or liability for your use of this information. Content Version: 34.0.513832; Last Revised: April 23, 2012 Please provide this summary of care documentation to your next provider. Signatures-by signing, you are acknowledging that this After Visit Summary has been reviewed with you and you have received a copy. Patient Signature:  ____________________________________________________________ Date:  ____________________________________________________________  
  
Raffi Burgos Provider Signature:  ____________________________________________________________ Date:  ____________________________________________________________

## 2018-08-20 NOTE — DISCHARGE INSTRUCTIONS
Wound Care: After Your Visit  Your Care Instructions  Taking good care of your wound at home will help it heal quickly and reduce your chance of infection. The doctor has checked you carefully, but problems can develop later. If you notice any problems or new symptoms, get medical treatment right away. Follow-up care is a key part of your treatment and safety. Be sure to make and go to all appointments, and call your doctor if you are having problems. It's also a good idea to know your test results and keep a list of the medicines you take. How can you care for yourself at home? · Clean the area with soap and water 2 times a day unless your doctor gives you different instructions. Don't use hydrogen peroxide or alcohol, which can slow healing. ¨ You may cover the wound with a thin layer of antibiotic ointment, such as bacitracin, and a nonstick bandage. ¨ Apply more ointment and replace the bandage as needed. · Take pain medicines exactly as directed. Some pain is normal with a wound, but do not ignore pain that is getting worse instead of better. You could have an infection. ¨ If the doctor gave you a prescription medicine for pain, take it as prescribed. ¨ If you are not taking a prescription pain medicine, ask your doctor if you can take an over-the-counter medicine. · Your doctor may have closed your wound with stitches (sutures), staples, or skin glue. ¨ If you have stitches, your doctor may remove them after several days to 2 weeks. Or you may have stitches that dissolve on their own. ¨ If you have staples, your doctor may remove them after 7 to 10 days. ¨ If your wound was closed with skin glue, the glue will wear off in a few days to 2 weeks. When should you call for help? Call your doctor now or seek immediate medical care if:  · You have signs of infection, such as:  ¨ Increased pain, swelling, warmth, or redness near the wound.   ¨ Red streaks leading from the wound.  ¨ Pus draining from the wound. ¨ A fever. · You bleed so much from your incision that you soak one or more bandages over 2 to 4 hours. Watch closely for changes in your health, and be sure to contact your doctor if:  · The wound is not getting better each day. Where can you learn more? Go to [employer]  Enter M973 in the search box to learn more about \"Wound Care: After Your Visit. \"   © 2197-2384 [employer]wise, Incorporated. Care instructions adapted under license by [employer] (which disclaims liability or warranty for this information). This care instruction is for use with your licensed healthcare professional. If you have questions about a medical condition or this instruction, always ask your healthcare professional. [address] any warranty or liability for your use of this information. Content Version: 17.7.626724; Last Revised: April 23, 2012        DISCHARGE SUMMARY from Nurse    Hydrocodone 5 mg / 325 mg of Tylenol given at 10:40 am.     PATIENT INSTRUCTIONS:    After general anesthesia or intravenous sedation, for 24 hours or while taking prescription Narcotics:  · Limit your activities  · Do not drive and operate hazardous machinery  · Do not make important personal or business decisions  · Do  not drink alcoholic beverages  · If you have not urinated within 8 hours after discharge, please contact your surgeon on call.     Report the following to your surgeon:  · Excessive pain, swelling, redness or odor of or around the surgical area  · Temperature over 100.5  · Nausea and vomiting lasting longer than 4 hours or if unable to take medications  · Any signs of decreased circulation or nerve impairment to extremity: change in color, persistent  numbness, tingling, coldness or increase pain  · Any questions    What to do at Home:  Recommended activity: Activity as tolerated and no driving for today and No driving while on analgesics,     If you experience any of the following symptoms ; as noted above, please follow up with Tien Martinez. *  Please give a list of your current medications to your Primary Care Provider. *  Please update this list whenever your medications are discontinued, doses are      changed, or new medications (including over-the-counter products) are added. *  Please carry medication information at all times in case of emergency situations. These are general instructions for a healthy lifestyle:    No smoking/ No tobacco products/ Avoid exposure to second hand smoke  Surgeon General's Warning:  Quitting smoking now greatly reduces serious risk to your health. Obesity, smoking, and sedentary lifestyle greatly increases your risk for illness    A healthy diet, regular physical exercise & weight monitoring are important for maintaining a healthy lifestyle    You may be retaining fluid if you have a history of heart failure or if you experience any of the following symptoms:  Weight gain of 3 pounds or more overnight or 5 pounds in a week, increased swelling in our hands or feet or shortness of breath while lying flat in bed. Please call your doctor as soon as you notice any of these symptoms; do not wait until your next office visit. Recognize signs and symptoms of STROKE:    F-face looks uneven    A-arms unable to move or move unevenly    S-speech slurred or non-existent    T-time-call 911 as soon as signs and symptoms begin-DO NOT go       Back to bed or wait to see if you get better-TIME IS BRAIN. Warning Signs of HEART ATTACK     Call 911 if you have these symptoms:   Chest discomfort. Most heart attacks involve discomfort in the center of the chest that lasts more than a few minutes, or that goes away and comes back. It can feel like uncomfortable pressure, squeezing, fullness, or pain.  Discomfort in other areas of the upper body.  Symptoms can include pain or discomfort in one or both arms, the back, neck, jaw, or stomach.  Shortness of breath with or without chest discomfort.  Other signs may include breaking out in a cold sweat, nausea, or lightheadedness. Don't wait more than five minutes to call 911 - MINUTES MATTER! Fast action can save your life. Calling 911 is almost always the fastest way to get lifesaving treatment. Emergency Medical Services staff can begin treatment when they arrive -- up to an hour sooner than if someone gets to the hospital by car. The discharge information has been reviewed with the patient and caregiver. The patient and caregiver verbalized understanding. Discharge medications reviewed with the patient and caregiver and appropriate educational materials and side effects teaching were provided.   ___________________________________________________________________________________________________________________________________

## 2018-08-20 NOTE — ROUTINE PROCESS
Patient: Lucian De La Cruz MRN: [id number]  SSN: [id number]   YOB: 1945  Age: 71 y.o. Sex: male     Patient is status post Procedure(s):  DIVISION AND INSET OF PEDICAL FLAP.     Surgeon(s) and Role:     * Christin Javier MD - Primary    Local/Dose/Irrigation:                    Peripheral IV 08/20/18 Left Forearm (Active)                           Dressing/Packing:  Wound Face-DRESSING TYPE:  (bacitracin oint adaptic, cotton ball 4x4 tape) (08/20/18 0700)  Splint/Cast:  ]    Other:

## 2018-08-20 NOTE — ROUTINE PROCESS
Patient: Joy Freitas MRN: [id number]  SSN: [id number]   YOB: 1945  Age: 71 y.o. Sex: male     Patient is status post Procedure(s):  DIVISION AND INSET OF PEDICAL FLAP.     Surgeon(s) and Role:     * Mendoza Ortega MD - Primary    Local/Dose/Irrigation:                    Peripheral IV 08/20/18 Left Forearm (Active)                           Dressing/Packing:  Wound Face-DRESSING TYPE:  (bacitracin oint) (08/20/18 0700)  Splint/Cast:  ]    Other:

## 2018-08-20 NOTE — PERIOP NOTES
Many attempts to call [facility] to give report S/P procedure without avail until 3:45 pm. Spoke with Ms. Flores at the home and she stated she had received discharge instructions that were given to his brother earlier. Opportunity for questions made available.

## 2018-08-20 NOTE — ANESTHESIA PREPROCEDURE EVALUATION
Anesthetic History   No history of anesthetic complications            Review of Systems / Medical History  Patient summary reviewed, nursing notes reviewed and pertinent labs reviewed    Pulmonary  Within defined limits                 Neuro/Psych         Psychiatric history    Comments: Parkinsons Cardiovascular    Hypertension                   GI/Hepatic/Renal  Within defined limits              Endo/Other      Hypothyroidism  Arthritis     Other Findings            Physical Exam    Airway  Mallampati: II  TM Distance: > 6 cm  Neck ROM: normal range of motion   Mouth opening: Normal     Cardiovascular  Regular rate and rhythm,  S1 and S2 normal,  no murmur, click, rub, or gallop             Dental    Dentition: Poor dentition     Pulmonary  Breath sounds clear to auscultation               Abdominal  GI exam deferred       Other Findings            Anesthetic Plan    ASA: 3  Anesthesia type: MAC          Induction: Intravenous  Anesthetic plan and risks discussed with: Patient

## 2018-08-20 NOTE — ANESTHESIA POSTPROCEDURE EVALUATION
Post-Anesthesia Evaluation and Assessment    Patient: Milan Villarreal MRN: [id number]  SSN: [id number]    YOB: 1945  Age: 71 y.o. Sex: male       Cardiovascular Function/Vital Signs  Visit Vitals    /77    Pulse (!) 57    Temp 36.4 °C (97.5 °F)    Resp 16    Ht 6' 2\" (1.88 m)    Wt 84.8 kg (187 lb)    SpO2 97%    BMI 24.01 kg/m2       Patient is status post MAC anesthesia for Procedure(s):  DIVISION AND INSET OF PEDICAL FLAP. Nausea/Vomiting: None    Postoperative hydration reviewed and adequate. Pain:  Pain Scale 1: Numeric (0 - 10) (08/20/18 0659)  Pain Intensity 1: 0 (08/20/18 0659)   Managed    Neurological Status:   Neuro (WDL): Within Defined Limits (08/20/18 0726)   At baseline    Mental Status and Level of Consciousness: Arousable    Pulmonary Status:   O2 Device: CO2 nasal cannula;Room air (08/20/18 1003)   Adequate oxygenation and airway patent    Complications related to anesthesia: None    Post-anesthesia assessment completed.  No concerns    Signed By: Dexter Koch MD     August 20, 2018

## 2018-08-21 NOTE — OP NOTES
[address]  OPERATIVE REPORT    Todd Alonso.  MR#: [id number]  : 1945  ACCOUNT #: [de-identified]   DATE OF SERVICE: 2018    PREOPERATIVE DIAGNOSIS:  Basal cell carcinoma of the nose and subtotal rhinectomy nasal defect. POSTOPERATIVE DIAGNOSIS:  Basal cell carcinoma of the nose and subtotal rhinectomy nasal defect. PROCEDURES PERFORMED:  1. Subfascial excision of basal cell carcinoma of the nasal dorsum and tip, extensive. 2.  Division and inset of pedicle flap at the nose. 3.  Division and inset of pedicle flap at the forehead. SURGEON:  To Márquez MD    ANESTHESIA:  Conscious sedation anesthesia. COMPLICATIONS:  No complications. ESTIMATED BLOOD LOSS:  10 mL. SPECIMENS REMOVED:  Deep subfascial deep tissue residual basal cell carcinoma of the nasal tip and dorsum. ASSISTANT:  0    IMPLANTS:  0    INDICATIONS AND FINDINGS:  The patient had a subtotal rhinectomy for an extensive basal cell carcinoma with negative frozen section margins changed to positive deep margins with permanent pathologic analysis and hence need for further resection planning subfascial resection of soft tissue of nose, mesenchyme, through fascia and including cartilaginous nasal tip and upper lateral cartilage preserving mucosa at the deep surface, labeling and marking this for pathology returning with negative margins. Otherwise, the pedicle flap reconstruction was divided and inset at 2 different sites, forehead and nose, and hence indications. DETAILS OF PROCEDURES:  In the operating room, the patient was induced under conscious sedation anesthesia. Following that he was prepped and draped in a sterile fashion. 1% lidocaine with 1:100,000 parts epinephrine was used for local infiltration at all sites.   First, the pedicle was divided first near the forehead sizing out the root of the pedicle, incising a fusiform around this base, full thickness, removing  muscle as part of this. Bipolar cautery for hemostasis. The skin edges were undermined sharply with a 15 blade and tenotomy scissors. A Burow's triangle was excised inferiorly in an oblique angle to remove standing cone deformity. The wound was then closed in 2 layers with 4-0 Vicryl in the deep subcutaneous plane and running locked sutures in the skin everting edges, when closure 3 cm. The remaining pedicle and inset of the forehead flap nasal reconstruction was then incised, removing the depth of the forehead, sharply dissecting the pedicle into the deep subcutaneous plane. This was continued through to the nostril level, the entire inset surface area of the forehead flap, though preserving the healed lateral edges and the edge with the nostril itself. The remaining nasal dorsum and paranasal tissue was then incised with a 15 blade, dissecting out the mesenchyme and remaining cartilage of the dorsum and lateral nasal wall sharply with tenotomy scissors, preserving the mucosa deep. This was marked with a stitch superiorly and inked superficially and handed off for pathologic analysis returning with negative margins. A jigsaw puzzle fit was then marked out and incised for the inset of the nasal reconstruction forehead flap trimming per the fatty tissue and insetting and closing in 2 further layers of 5-0 Vicryl in the deep subcutaneous plane and running locked and interrupted 5-0 plain gut sutures in the skin. Patient was then handed over to anesthesia for awakening and transferred to the recovery room.       MD KATIE Patino / RENE  D: 08/20/2018 15:24     T: 08/20/2018 22:53  JOB #: [id number]  CC: [address] [place] [address], Nose, Throat [facility]

## 2019-04-02 ENCOUNTER — HOSPITAL ENCOUNTER (OUTPATIENT)
Dept: CT IMAGING | Age: 74
Discharge: HOME | End: 2019-04-02
Attending: OTOLARYNGOLOGY
Payer: MEDICARE

## 2019-04-02 DIAGNOSIS — C44.311 BASAL CELL CARCINOMA OF NASOLABIAL GROOVE: ICD-10-CM

## 2019-04-02 DIAGNOSIS — C44.320 SQUAMOUS CELL CARCINOMA, FACE: ICD-10-CM

## 2019-04-02 DIAGNOSIS — L57.0 ACTINIC KERATOSIS: ICD-10-CM

## 2019-04-02 LAB — CREAT BLD-MCNC: 1.1 MG/DL (ref 0.6–1.3)

## 2019-04-02 PROCEDURE — 82565 ASSAY OF CREATININE: CPT

## 2019-04-02 PROCEDURE — 74011636320 HC RX REV CODE- 636/320: Performed by: OTOLARYNGOLOGY

## 2019-04-02 PROCEDURE — 70487 CT MAXILLOFACIAL W/DYE: CPT

## 2019-04-02 RX ADMIN — IOPAMIDOL 100 ML: 612 INJECTION, SOLUTION INTRAVENOUS at 10:28

## 2019-05-09 ENCOUNTER — HOSPITAL ENCOUNTER (OUTPATIENT)
Dept: PREADMISSION TESTING | Age: 74
Discharge: HOME | End: 2019-05-09
Payer: MEDICARE

## 2019-05-09 VITALS
TEMPERATURE: 97.7 F | HEIGHT: 74 IN | RESPIRATION RATE: 20 BRPM | BODY MASS INDEX: 22.2 KG/M2 | WEIGHT: 173 LBS | HEART RATE: 72 BPM | DIASTOLIC BLOOD PRESSURE: 65 MMHG | SYSTOLIC BLOOD PRESSURE: 108 MMHG

## 2019-05-09 LAB
ATRIAL RATE: 54 BPM
BASOPHILS # BLD: 0.1 K/UL (ref 0–0.1)
BASOPHILS NFR BLD: 1 % (ref 0–1)
CALCULATED P AXIS, ECG09: 107 DEGREES
CALCULATED R AXIS, ECG10: 3 DEGREES
CALCULATED T AXIS, ECG11: 63 DEGREES
DIAGNOSIS, 93000: NORMAL
DIFFERENTIAL METHOD BLD: ABNORMAL
EOSINOPHIL # BLD: 0.3 K/UL (ref 0–0.4)
EOSINOPHIL NFR BLD: 4 % (ref 0–7)
ERYTHROCYTE [DISTWIDTH] IN BLOOD BY AUTOMATED COUNT: 12.7 % (ref 11.5–14.5)
HCT VFR BLD AUTO: 35.3 % (ref 36.6–50.3)
HGB BLD-MCNC: 11.3 G/DL (ref 12.1–17)
IMM GRANULOCYTES # BLD AUTO: 0 K/UL (ref 0–0.04)
IMM GRANULOCYTES NFR BLD AUTO: 0 % (ref 0–0.5)
LYMPHOCYTES # BLD: 1.3 K/UL (ref 0.8–3.5)
LYMPHOCYTES NFR BLD: 18 % (ref 12–49)
MCH RBC QN AUTO: 31.4 PG (ref 26–34)
MCHC RBC AUTO-ENTMCNC: 32 G/DL (ref 30–36.5)
MCV RBC AUTO: 98.1 FL (ref 80–99)
MONOCYTES # BLD: 0.9 K/UL (ref 0–1)
MONOCYTES NFR BLD: 13 % (ref 5–13)
NEUTS SEG # BLD: 4.4 K/UL (ref 1.8–8)
NEUTS SEG NFR BLD: 64 % (ref 32–75)
NRBC # BLD: 0 K/UL (ref 0–0.01)
NRBC BLD-RTO: 0 PER 100 WBC
P-R INTERVAL, ECG05: 144 MS
PLATELET # BLD AUTO: 322 K/UL (ref 150–400)
PMV BLD AUTO: 10.8 FL (ref 8.9–12.9)
Q-T INTERVAL, ECG07: 454 MS
QRS DURATION, ECG06: 76 MS
QTC CALCULATION (BEZET), ECG08: 430 MS
RBC # BLD AUTO: 3.6 M/UL (ref 4.1–5.7)
VENTRICULAR RATE, ECG03: 54 BPM
WBC # BLD AUTO: 7 K/UL (ref 4.1–11.1)

## 2019-05-09 PROCEDURE — 36415 COLL VENOUS BLD VENIPUNCTURE: CPT

## 2019-05-09 PROCEDURE — 93005 ELECTROCARDIOGRAM TRACING: CPT

## 2019-05-09 PROCEDURE — 85025 COMPLETE CBC W/AUTO DIFF WBC: CPT

## 2019-05-09 RX ORDER — LEVOTHYROXINE SODIUM 200 UG/1
225 TABLET ORAL
COMMUNITY
End: 2019-05-09

## 2019-05-09 RX ORDER — ACETAMINOPHEN 325 MG/1
650 TABLET ORAL
COMMUNITY

## 2019-05-09 RX ORDER — CARBIDOPA AND LEVODOPA 25; 100 MG/1; MG/1
1.5 TABLET ORAL 3 TIMES DAILY
COMMUNITY

## 2019-05-09 RX ORDER — LANOLIN ALCOHOL/MO/W.PET/CERES
325 CREAM (GRAM) TOPICAL
COMMUNITY

## 2019-05-09 RX ORDER — ASPIRIN 81 MG/1
81 TABLET ORAL DAILY
COMMUNITY
End: 2019-05-21

## 2019-05-09 RX ORDER — DOCUSATE SODIUM 100 MG/1
100 CAPSULE, LIQUID FILLED ORAL DAILY
COMMUNITY

## 2019-05-09 RX ORDER — POTASSIUM CHLORIDE 20 MEQ/1
40 TABLET, EXTENDED RELEASE ORAL DAILY
COMMUNITY

## 2019-05-09 RX ORDER — POTASSIUM CHLORIDE 20 MEQ/1
20 TABLET, EXTENDED RELEASE ORAL
COMMUNITY

## 2019-05-20 ENCOUNTER — HOSPITAL ENCOUNTER (OUTPATIENT)
Age: 74
Setting detail: OBSERVATION
Discharge: HOME | End: 2019-05-21
Attending: OTOLARYNGOLOGY | Admitting: OTOLARYNGOLOGY
Payer: MEDICARE

## 2019-05-20 ENCOUNTER — ANESTHESIA (OUTPATIENT)
Dept: SURGERY | Age: 74
End: 2019-05-20
Payer: MEDICARE

## 2019-05-20 ENCOUNTER — ANESTHESIA EVENT (OUTPATIENT)
Dept: SURGERY | Age: 74
End: 2019-05-20
Payer: MEDICARE

## 2019-05-20 DIAGNOSIS — C44.222 SCC (SQUAMOUS CELL CARCINOMA), EAR, RIGHT: Primary | ICD-10-CM

## 2019-05-20 PROBLEM — G89.18 POST-OP PAIN: Status: ACTIVE | Noted: 2019-05-20

## 2019-05-20 PROCEDURE — 76210000016 HC OR PH I REC 1 TO 1.5 HR: Performed by: OTOLARYNGOLOGY

## 2019-05-20 PROCEDURE — 76010000149 HC OR TIME 1 TO 1.5 HR: Performed by: OTOLARYNGOLOGY

## 2019-05-20 PROCEDURE — 76010000153 HC OR TIME 1.5 TO 2 HR: Performed by: OTOLARYNGOLOGY

## 2019-05-20 PROCEDURE — 74011250637 HC RX REV CODE- 250/637: Performed by: ANESTHESIOLOGY

## 2019-05-20 PROCEDURE — 93005 ELECTROCARDIOGRAM TRACING: CPT

## 2019-05-20 PROCEDURE — 77030014008 HC SPNG HEMSTAT J&J -C: Performed by: OTOLARYNGOLOGY

## 2019-05-20 PROCEDURE — 77030008684 HC TU ET CUF COVD -B: Performed by: ANESTHESIOLOGY

## 2019-05-20 PROCEDURE — 74011000250 HC RX REV CODE- 250: Performed by: OTOLARYNGOLOGY

## 2019-05-20 PROCEDURE — 88331 PATH CONSLTJ SURG 1 BLK 1SPC: CPT

## 2019-05-20 PROCEDURE — 74011250636 HC RX REV CODE- 250/636

## 2019-05-20 PROCEDURE — 77030002974 HC SUT PLN J&J -A: Performed by: OTOLARYNGOLOGY

## 2019-05-20 PROCEDURE — 77030002996 HC SUT SLK J&J -A: Performed by: OTOLARYNGOLOGY

## 2019-05-20 PROCEDURE — 77030013079 HC BLNKT BAIR HGGR 3M -A: Performed by: ANESTHESIOLOGY

## 2019-05-20 PROCEDURE — 74011250636 HC RX REV CODE- 250/636: Performed by: OTOLARYNGOLOGY

## 2019-05-20 PROCEDURE — 74011250636 HC RX REV CODE- 250/636: Performed by: ANESTHESIOLOGY

## 2019-05-20 PROCEDURE — 77030026438 HC STYL ET INTUB CARD -A: Performed by: ANESTHESIOLOGY

## 2019-05-20 PROCEDURE — 99218 HC RM OBSERVATION: CPT

## 2019-05-20 PROCEDURE — 74011000250 HC RX REV CODE- 250

## 2019-05-20 PROCEDURE — 77030008467 HC STPLR SKN COVD -B: Performed by: OTOLARYNGOLOGY

## 2019-05-20 PROCEDURE — 77030031139 HC SUT VCRL2 J&J -A: Performed by: OTOLARYNGOLOGY

## 2019-05-20 PROCEDURE — 77030032490 HC SLV COMPR SCD KNE COVD -B: Performed by: OTOLARYNGOLOGY

## 2019-05-20 PROCEDURE — 76210000002 HC OR PH I REC 3 TO 3.5 HR: Performed by: OTOLARYNGOLOGY

## 2019-05-20 PROCEDURE — 76060000034 HC ANESTHESIA 1.5 TO 2 HR: Performed by: OTOLARYNGOLOGY

## 2019-05-20 PROCEDURE — 77030018846 HC SOL IRR STRL H20 ICUM -A: Performed by: OTOLARYNGOLOGY

## 2019-05-20 PROCEDURE — 76060000033 HC ANESTHESIA 1 TO 1.5 HR: Performed by: OTOLARYNGOLOGY

## 2019-05-20 PROCEDURE — 77030034850

## 2019-05-20 PROCEDURE — 88305 TISSUE EXAM BY PATHOLOGIST: CPT

## 2019-05-20 PROCEDURE — 77030040356 HC CORD BPLR FRCP COVD -A: Performed by: OTOLARYNGOLOGY

## 2019-05-20 PROCEDURE — 77030036668 HC HEMSTAT APPL W/HEMADERM KT -BARD -F: Performed by: OTOLARYNGOLOGY

## 2019-05-20 PROCEDURE — 77030011640 HC PAD GRND REM COVD -A: Performed by: OTOLARYNGOLOGY

## 2019-05-20 PROCEDURE — 77030018836 HC SOL IRR NACL ICUM -A: Performed by: OTOLARYNGOLOGY

## 2019-05-20 PROCEDURE — 77030011267 HC ELECTRD BLD COVD -A: Performed by: OTOLARYNGOLOGY

## 2019-05-20 PROCEDURE — 77030020782 HC GWN BAIR PAWS FLX 3M -B

## 2019-05-20 RX ORDER — BACITRACIN 500 [USP'U]/G
OINTMENT TOPICAL AS NEEDED
Status: DISCONTINUED | OUTPATIENT
Start: 2019-05-20 | End: 2019-05-20 | Stop reason: HOSPADM

## 2019-05-20 RX ORDER — MIDAZOLAM HYDROCHLORIDE 1 MG/ML
1 INJECTION, SOLUTION INTRAMUSCULAR; INTRAVENOUS AS NEEDED
Status: DISCONTINUED | OUTPATIENT
Start: 2019-05-20 | End: 2019-05-20 | Stop reason: HOSPADM

## 2019-05-20 RX ORDER — ESMOLOL HYDROCHLORIDE 10 MG/ML
INJECTION INTRAVENOUS AS NEEDED
Status: DISCONTINUED | OUTPATIENT
Start: 2019-05-20 | End: 2019-05-20 | Stop reason: HOSPADM

## 2019-05-20 RX ORDER — SODIUM CHLORIDE, SODIUM LACTATE, POTASSIUM CHLORIDE, CALCIUM CHLORIDE 600; 310; 30; 20 MG/100ML; MG/100ML; MG/100ML; MG/100ML
1000 INJECTION, SOLUTION INTRAVENOUS CONTINUOUS
Status: DISCONTINUED | OUTPATIENT
Start: 2019-05-20 | End: 2019-05-20 | Stop reason: HOSPADM

## 2019-05-20 RX ORDER — HYDROMORPHONE HYDROCHLORIDE 1 MG/ML
0.2 INJECTION, SOLUTION INTRAMUSCULAR; INTRAVENOUS; SUBCUTANEOUS
Status: DISCONTINUED | OUTPATIENT
Start: 2019-05-20 | End: 2019-05-20 | Stop reason: HOSPADM

## 2019-05-20 RX ORDER — CEPHALEXIN 500 MG/1
500 CAPSULE ORAL 3 TIMES DAILY
Qty: 21 CAP | Refills: 0 | Status: SHIPPED | OUTPATIENT
Start: 2019-05-20 | End: 2019-05-27

## 2019-05-20 RX ORDER — LIDOCAINE HYDROCHLORIDE AND EPINEPHRINE 10; 10 MG/ML; UG/ML
INJECTION, SOLUTION INFILTRATION; PERINEURAL AS NEEDED
Status: DISCONTINUED | OUTPATIENT
Start: 2019-05-20 | End: 2019-05-20 | Stop reason: HOSPADM

## 2019-05-20 RX ORDER — SODIUM CHLORIDE, SODIUM LACTATE, POTASSIUM CHLORIDE, CALCIUM CHLORIDE 600; 310; 30; 20 MG/100ML; MG/100ML; MG/100ML; MG/100ML
100 INJECTION, SOLUTION INTRAVENOUS CONTINUOUS
Status: DISCONTINUED | OUTPATIENT
Start: 2019-05-20 | End: 2019-05-20 | Stop reason: HOSPADM

## 2019-05-20 RX ORDER — DEXTROSE, SODIUM CHLORIDE, SODIUM LACTATE, POTASSIUM CHLORIDE, AND CALCIUM CHLORIDE 5; .6; .31; .03; .02 G/100ML; G/100ML; G/100ML; G/100ML; G/100ML
100 INJECTION, SOLUTION INTRAVENOUS CONTINUOUS
Status: DISCONTINUED | OUTPATIENT
Start: 2019-05-20 | End: 2019-05-20 | Stop reason: HOSPADM

## 2019-05-20 RX ORDER — SODIUM CHLORIDE 9 MG/ML
25 INJECTION, SOLUTION INTRAVENOUS CONTINUOUS
Status: DISCONTINUED | OUTPATIENT
Start: 2019-05-20 | End: 2019-05-20 | Stop reason: HOSPADM

## 2019-05-20 RX ORDER — SODIUM CHLORIDE 0.9 % (FLUSH) 0.9 %
5-40 SYRINGE (ML) INJECTION AS NEEDED
Status: DISCONTINUED | OUTPATIENT
Start: 2019-05-20 | End: 2019-05-20 | Stop reason: HOSPADM

## 2019-05-20 RX ORDER — OXYCODONE AND ACETAMINOPHEN 5; 325 MG/1; MG/1
1 TABLET ORAL
Status: DISCONTINUED | OUTPATIENT
Start: 2019-05-20 | End: 2019-05-21 | Stop reason: HOSPADM

## 2019-05-20 RX ORDER — FENTANYL CITRATE 50 UG/ML
50 INJECTION, SOLUTION INTRAMUSCULAR; INTRAVENOUS
Status: COMPLETED | OUTPATIENT
Start: 2019-05-20 | End: 2019-05-20

## 2019-05-20 RX ORDER — CEPHALEXIN 250 MG/1
500 CAPSULE ORAL 3 TIMES DAILY
Qty: 80 CAP | Refills: 0 | Status: SHIPPED | OUTPATIENT
Start: 2019-05-20 | End: 2019-05-27

## 2019-05-20 RX ORDER — HYDROCODONE BITARTRATE AND ACETAMINOPHEN 7.5; 325 MG/1; MG/1
2 TABLET ORAL
Qty: 30 TAB | Refills: 0 | Status: SHIPPED | OUTPATIENT
Start: 2019-05-20 | End: 2019-05-27

## 2019-05-20 RX ORDER — LABETALOL HCL 20 MG/4 ML
SYRINGE (ML) INTRAVENOUS
Status: COMPLETED
Start: 2019-05-20 | End: 2019-05-20

## 2019-05-20 RX ORDER — FENTANYL CITRATE 50 UG/ML
25 INJECTION, SOLUTION INTRAMUSCULAR; INTRAVENOUS
Status: DISCONTINUED | OUTPATIENT
Start: 2019-05-20 | End: 2019-05-20 | Stop reason: HOSPADM

## 2019-05-20 RX ORDER — ROCURONIUM BROMIDE 10 MG/ML
INJECTION, SOLUTION INTRAVENOUS AS NEEDED
Status: DISCONTINUED | OUTPATIENT
Start: 2019-05-20 | End: 2019-05-20 | Stop reason: HOSPADM

## 2019-05-20 RX ORDER — MIDAZOLAM HYDROCHLORIDE 1 MG/ML
INJECTION, SOLUTION INTRAMUSCULAR; INTRAVENOUS AS NEEDED
Status: DISCONTINUED | OUTPATIENT
Start: 2019-05-20 | End: 2019-05-20 | Stop reason: HOSPADM

## 2019-05-20 RX ORDER — MIDAZOLAM HYDROCHLORIDE 1 MG/ML
0.5 INJECTION, SOLUTION INTRAMUSCULAR; INTRAVENOUS
Status: DISCONTINUED | OUTPATIENT
Start: 2019-05-20 | End: 2019-05-20 | Stop reason: HOSPADM

## 2019-05-20 RX ORDER — HYDROMORPHONE HYDROCHLORIDE 1 MG/ML
0.5 INJECTION, SOLUTION INTRAMUSCULAR; INTRAVENOUS; SUBCUTANEOUS
Status: DISCONTINUED | OUTPATIENT
Start: 2019-05-20 | End: 2019-05-20 | Stop reason: HOSPADM

## 2019-05-20 RX ORDER — ROPIVACAINE HYDROCHLORIDE 5 MG/ML
30 INJECTION, SOLUTION EPIDURAL; INFILTRATION; PERINEURAL AS NEEDED
Status: DISCONTINUED | OUTPATIENT
Start: 2019-05-20 | End: 2019-05-20 | Stop reason: HOSPADM

## 2019-05-20 RX ORDER — ALBUTEROL SULFATE 0.83 MG/ML
2.5 SOLUTION RESPIRATORY (INHALATION) AS NEEDED
Status: DISCONTINUED | OUTPATIENT
Start: 2019-05-20 | End: 2019-05-20 | Stop reason: HOSPADM

## 2019-05-20 RX ORDER — LIDOCAINE HYDROCHLORIDE 20 MG/ML
INJECTION, SOLUTION EPIDURAL; INFILTRATION; INTRACAUDAL; PERINEURAL AS NEEDED
Status: DISCONTINUED | OUTPATIENT
Start: 2019-05-20 | End: 2019-05-20 | Stop reason: HOSPADM

## 2019-05-20 RX ORDER — SODIUM CHLORIDE, SODIUM LACTATE, POTASSIUM CHLORIDE, CALCIUM CHLORIDE 600; 310; 30; 20 MG/100ML; MG/100ML; MG/100ML; MG/100ML
INJECTION, SOLUTION INTRAVENOUS
Status: DISCONTINUED | OUTPATIENT
Start: 2019-05-20 | End: 2019-05-20 | Stop reason: HOSPADM

## 2019-05-20 RX ORDER — FENTANYL CITRATE 50 UG/ML
50 INJECTION, SOLUTION INTRAMUSCULAR; INTRAVENOUS AS NEEDED
Status: DISCONTINUED | OUTPATIENT
Start: 2019-05-20 | End: 2019-05-20 | Stop reason: HOSPADM

## 2019-05-20 RX ORDER — SODIUM CHLORIDE 0.9 % (FLUSH) 0.9 %
5-40 SYRINGE (ML) INJECTION AS NEEDED
Status: DISCONTINUED | OUTPATIENT
Start: 2019-05-20 | End: 2019-05-21 | Stop reason: HOSPADM

## 2019-05-20 RX ORDER — LIDOCAINE HYDROCHLORIDE 10 MG/ML
0.1 INJECTION, SOLUTION EPIDURAL; INFILTRATION; INTRACAUDAL; PERINEURAL AS NEEDED
Status: DISCONTINUED | OUTPATIENT
Start: 2019-05-20 | End: 2019-05-20 | Stop reason: HOSPADM

## 2019-05-20 RX ORDER — ONDANSETRON 2 MG/ML
INJECTION INTRAMUSCULAR; INTRAVENOUS AS NEEDED
Status: DISCONTINUED | OUTPATIENT
Start: 2019-05-20 | End: 2019-05-20 | Stop reason: HOSPADM

## 2019-05-20 RX ORDER — FENTANYL CITRATE 50 UG/ML
25 INJECTION, SOLUTION INTRAMUSCULAR; INTRAVENOUS
Status: COMPLETED | OUTPATIENT
Start: 2019-05-20 | End: 2019-05-20

## 2019-05-20 RX ORDER — SODIUM CHLORIDE 0.9 % (FLUSH) 0.9 %
5-40 SYRINGE (ML) INJECTION EVERY 8 HOURS
Status: DISCONTINUED | OUTPATIENT
Start: 2019-05-20 | End: 2019-05-20 | Stop reason: HOSPADM

## 2019-05-20 RX ORDER — MORPHINE SULFATE 10 MG/ML
2 INJECTION, SOLUTION INTRAMUSCULAR; INTRAVENOUS
Status: DISCONTINUED | OUTPATIENT
Start: 2019-05-20 | End: 2019-05-20 | Stop reason: HOSPADM

## 2019-05-20 RX ORDER — PHENYLEPHRINE HCL IN 0.9% NACL 0.4MG/10ML
SYRINGE (ML) INTRAVENOUS AS NEEDED
Status: DISCONTINUED | OUTPATIENT
Start: 2019-05-20 | End: 2019-05-20 | Stop reason: HOSPADM

## 2019-05-20 RX ORDER — CEFAZOLIN SODIUM IN 0.9 % NACL 2 G/100 ML
PLASTIC BAG, INJECTION (ML) INTRAVENOUS AS NEEDED
Status: DISCONTINUED | OUTPATIENT
Start: 2019-05-20 | End: 2019-05-20 | Stop reason: HOSPADM

## 2019-05-20 RX ORDER — GLYCOPYRROLATE 0.2 MG/ML
0.2 INJECTION INTRAMUSCULAR; INTRAVENOUS
Status: DISCONTINUED | OUTPATIENT
Start: 2019-05-20 | End: 2019-05-20 | Stop reason: HOSPADM

## 2019-05-20 RX ORDER — LABETALOL HYDROCHLORIDE 5 MG/ML
20 INJECTION, SOLUTION INTRAVENOUS
Status: DISCONTINUED | OUTPATIENT
Start: 2019-05-20 | End: 2019-05-20 | Stop reason: HOSPADM

## 2019-05-20 RX ORDER — ACETAMINOPHEN 325 MG/1
650 TABLET ORAL ONCE
Status: DISCONTINUED | OUTPATIENT
Start: 2019-05-20 | End: 2019-05-20 | Stop reason: HOSPADM

## 2019-05-20 RX ORDER — FENTANYL CITRATE 50 UG/ML
INJECTION, SOLUTION INTRAMUSCULAR; INTRAVENOUS
Status: COMPLETED
Start: 2019-05-20 | End: 2019-05-20

## 2019-05-20 RX ORDER — ONDANSETRON 2 MG/ML
4 INJECTION INTRAMUSCULAR; INTRAVENOUS AS NEEDED
Status: DISCONTINUED | OUTPATIENT
Start: 2019-05-20 | End: 2019-05-20 | Stop reason: HOSPADM

## 2019-05-20 RX ORDER — CEFAZOLIN SODIUM/WATER 2 G/20 ML
2 SYRINGE (ML) INTRAVENOUS EVERY 8 HOURS
Status: COMPLETED | OUTPATIENT
Start: 2019-05-20 | End: 2019-05-20

## 2019-05-20 RX ORDER — SUCCINYLCHOLINE CHLORIDE 20 MG/ML
INJECTION INTRAMUSCULAR; INTRAVENOUS AS NEEDED
Status: DISCONTINUED | OUTPATIENT
Start: 2019-05-20 | End: 2019-05-20 | Stop reason: HOSPADM

## 2019-05-20 RX ORDER — HYDROCODONE BITARTRATE AND ACETAMINOPHEN 5; 325 MG/1; MG/1
1 TABLET ORAL AS NEEDED
Status: DISCONTINUED | OUTPATIENT
Start: 2019-05-20 | End: 2019-05-20 | Stop reason: HOSPADM

## 2019-05-20 RX ORDER — MORPHINE SULFATE 2 MG/ML
2 INJECTION, SOLUTION INTRAMUSCULAR; INTRAVENOUS
Status: DISCONTINUED | OUTPATIENT
Start: 2019-05-20 | End: 2019-05-21 | Stop reason: HOSPADM

## 2019-05-20 RX ORDER — PROPOFOL 10 MG/ML
INJECTION, EMULSION INTRAVENOUS AS NEEDED
Status: DISCONTINUED | OUTPATIENT
Start: 2019-05-20 | End: 2019-05-20 | Stop reason: HOSPADM

## 2019-05-20 RX ORDER — FENTANYL CITRATE 50 UG/ML
INJECTION, SOLUTION INTRAMUSCULAR; INTRAVENOUS AS NEEDED
Status: DISCONTINUED | OUTPATIENT
Start: 2019-05-20 | End: 2019-05-20 | Stop reason: HOSPADM

## 2019-05-20 RX ORDER — ACETAMINOPHEN 325 MG/1
650 TABLET ORAL ONCE
Status: COMPLETED | OUTPATIENT
Start: 2019-05-20 | End: 2019-05-20

## 2019-05-20 RX ORDER — DIPHENHYDRAMINE HYDROCHLORIDE 50 MG/ML
12.5 INJECTION, SOLUTION INTRAMUSCULAR; INTRAVENOUS AS NEEDED
Status: DISCONTINUED | OUTPATIENT
Start: 2019-05-20 | End: 2019-05-20 | Stop reason: HOSPADM

## 2019-05-20 RX ORDER — SODIUM CHLORIDE 0.9 % (FLUSH) 0.9 %
5-40 SYRINGE (ML) INJECTION EVERY 8 HOURS
Status: DISCONTINUED | OUTPATIENT
Start: 2019-05-20 | End: 2019-05-21 | Stop reason: HOSPADM

## 2019-05-20 RX ADMIN — PROPOFOL 150 MG: 10 INJECTION, EMULSION INTRAVENOUS at 18:29

## 2019-05-20 RX ADMIN — LIDOCAINE HYDROCHLORIDE 100 MG: 20 INJECTION, SOLUTION EPIDURAL; INFILTRATION; INTRACAUDAL; PERINEURAL at 18:29

## 2019-05-20 RX ADMIN — PROPOFOL 100 MG: 10 INJECTION, EMULSION INTRAVENOUS at 13:32

## 2019-05-20 RX ADMIN — Medication 10 ML: at 22:11

## 2019-05-20 RX ADMIN — ROCURONIUM BROMIDE 5 MG: 10 INJECTION, SOLUTION INTRAVENOUS at 18:29

## 2019-05-20 RX ADMIN — LABETALOL HYDROCHLORIDE 20 MG: 5 INJECTION, SOLUTION INTRAVENOUS at 20:13

## 2019-05-20 RX ADMIN — ESMOLOL HYDROCHLORIDE 20 MG: 10 INJECTION INTRAVENOUS at 14:23

## 2019-05-20 RX ADMIN — ACETAMINOPHEN 650 MG: 325 TABLET ORAL at 11:45

## 2019-05-20 RX ADMIN — LABETALOL 20 MG/4 ML (5 MG/ML) INTRAVENOUS SYRINGE 20 MG: at 20:13

## 2019-05-20 RX ADMIN — MIDAZOLAM HYDROCHLORIDE 1 MG: 1 INJECTION, SOLUTION INTRAMUSCULAR; INTRAVENOUS at 13:32

## 2019-05-20 RX ADMIN — Medication 80 MCG: at 18:42

## 2019-05-20 RX ADMIN — FENTANYL CITRATE 25 MCG: 50 INJECTION, SOLUTION INTRAMUSCULAR; INTRAVENOUS at 14:53

## 2019-05-20 RX ADMIN — Medication 80 MCG: at 14:02

## 2019-05-20 RX ADMIN — LIDOCAINE HYDROCHLORIDE 100 MG: 20 INJECTION, SOLUTION EPIDURAL; INFILTRATION; INTRACAUDAL; PERINEURAL at 13:32

## 2019-05-20 RX ADMIN — FENTANYL CITRATE 25 MCG: 50 INJECTION, SOLUTION INTRAMUSCULAR; INTRAVENOUS at 17:35

## 2019-05-20 RX ADMIN — Medication 2 G: at 13:46

## 2019-05-20 RX ADMIN — Medication 120 MCG: at 14:05

## 2019-05-20 RX ADMIN — FENTANYL CITRATE 25 MCG: 50 INJECTION, SOLUTION INTRAMUSCULAR; INTRAVENOUS at 17:30

## 2019-05-20 RX ADMIN — FENTANYL CITRATE 50 MCG: 50 INJECTION, SOLUTION INTRAMUSCULAR; INTRAVENOUS at 18:15

## 2019-05-20 RX ADMIN — SODIUM CHLORIDE, SODIUM LACTATE, POTASSIUM CHLORIDE, CALCIUM CHLORIDE: 600; 310; 30; 20 INJECTION, SOLUTION INTRAVENOUS at 13:32

## 2019-05-20 RX ADMIN — FENTANYL CITRATE 50 MCG: 50 INJECTION, SOLUTION INTRAMUSCULAR; INTRAVENOUS at 13:32

## 2019-05-20 RX ADMIN — FENTANYL CITRATE 25 MCG: 50 INJECTION, SOLUTION INTRAMUSCULAR; INTRAVENOUS at 17:40

## 2019-05-20 RX ADMIN — SODIUM CHLORIDE, SODIUM LACTATE, POTASSIUM CHLORIDE, CALCIUM CHLORIDE: 600; 310; 30; 20 INJECTION, SOLUTION INTRAVENOUS at 18:16

## 2019-05-20 RX ADMIN — ONDANSETRON 4 MG: 2 INJECTION INTRAMUSCULAR; INTRAVENOUS at 14:39

## 2019-05-20 RX ADMIN — SODIUM CHLORIDE, SODIUM LACTATE, POTASSIUM CHLORIDE, CALCIUM CHLORIDE: 600; 310; 30; 20 INJECTION, SOLUTION INTRAVENOUS at 13:22

## 2019-05-20 RX ADMIN — SODIUM CHLORIDE, SODIUM LACTATE, POTASSIUM CHLORIDE, AND CALCIUM CHLORIDE 100 ML/HR: 600; 310; 30; 20 INJECTION, SOLUTION INTRAVENOUS at 11:39

## 2019-05-20 RX ADMIN — Medication 10 ML: at 17:48

## 2019-05-20 RX ADMIN — Medication 80 MCG: at 14:23

## 2019-05-20 RX ADMIN — SUCCINYLCHOLINE CHLORIDE 120 MG: 20 INJECTION INTRAMUSCULAR; INTRAVENOUS at 18:29

## 2019-05-20 RX ADMIN — PROPOFOL 50 MG: 10 INJECTION, EMULSION INTRAVENOUS at 14:49

## 2019-05-20 RX ADMIN — Medication 120 MCG: at 14:26

## 2019-05-20 RX ADMIN — FENTANYL CITRATE 25 MCG: 50 INJECTION, SOLUTION INTRAMUSCULAR; INTRAVENOUS at 17:45

## 2019-05-20 RX ADMIN — FENTANYL CITRATE 25 MCG: 50 INJECTION INTRAMUSCULAR; INTRAVENOUS at 17:30

## 2019-05-20 RX ADMIN — ROCURONIUM BROMIDE 5 MG: 10 INJECTION, SOLUTION INTRAVENOUS at 13:32

## 2019-05-20 RX ADMIN — Medication 2 G: at 18:35

## 2019-05-20 RX ADMIN — FENTANYL CITRATE 50 MCG: 50 INJECTION, SOLUTION INTRAMUSCULAR; INTRAVENOUS at 18:20

## 2019-05-20 RX ADMIN — SUCCINYLCHOLINE CHLORIDE 100 MG: 20 INJECTION INTRAMUSCULAR; INTRAVENOUS at 13:32

## 2019-05-20 NOTE — ANESTHESIA PREPROCEDURE EVALUATION
Relevant Problems No relevant active problems Anesthetic History No history of anesthetic complications Review of Systems / Medical History Patient summary reviewed, nursing notes reviewed and pertinent labs reviewed Pulmonary Within defined limits Neuro/Psych Neuromuscular disease and psychiatric history Comments: Parkinson's Cardiovascular Hypertension Dysrhythmias : atrial fibrillation Exercise tolerance: >4 METS Comments: New onset Afib noted in PACU with post-op bleeding at surgical site GI/Hepatic/Renal 
Within defined limits GERD Endo/Other Within defined limits Hypothyroidism Arthritis Other Findings Physical Exam 
 
Airway Mallampati: III 
TM Distance: > 6 cm Neck ROM: decreased range of motion Mouth opening: Normal 
 
 Cardiovascular Regular rate and rhythm,  S1 and S2 normal,  no murmur, click, rub, or gallop Dental 
No notable dental hx Pulmonary Breath sounds clear to auscultation Abdominal 
GI exam deferred Other Findings Anesthetic Plan ASA: 2 Anesthesia type: general 
 
Monitoring Plan: BIS Induction: Intravenous Anesthetic plan and risks discussed with: Patient

## 2019-05-20 NOTE — PERIOP NOTES
Telephone consent signed by patients brother and patient taken back to OR with Dr Kat Henderson to stop post op bleeding

## 2019-05-20 NOTE — ANESTHESIA PREPROCEDURE EVALUATION
Relevant Problems No relevant active problems Anesthetic History No history of anesthetic complications Review of Systems / Medical History Patient summary reviewed, nursing notes reviewed and pertinent labs reviewed Pulmonary Within defined limits Neuro/Psych Within defined limits Psychiatric history Cardiovascular Within defined limits Hypertension Exercise tolerance: >4 METS 
  
GI/Hepatic/Renal 
Within defined limits GERD Endo/Other Within defined limits Hypothyroidism Arthritis Other Findings Physical Exam 
 
Airway Mallampati: III 
TM Distance: > 6 cm Neck ROM: decreased range of motion Mouth opening: Normal 
 
 Cardiovascular Regular rate and rhythm,  S1 and S2 normal,  no murmur, click, rub, or gallop Dental 
No notable dental hx Pulmonary Breath sounds clear to auscultation Abdominal 
GI exam deferred Other Findings Anesthetic Plan ASA: 2 Anesthesia type: general 
 
Monitoring Plan: BIS Induction: Intravenous Anesthetic plan and risks discussed with: Patient

## 2019-05-20 NOTE — ROUTINE PROCESS
Patient: Fátima Andrews MRN: [id number]  SSN: [id number] YOB: 1945  Age: 68 y.o. Sex: male Patient is status post Procedure(s): 
PARTIAL AURICULECTOMY OF RIGHT EAR AND REARRANGEMENT OF TISSUE. Surgeon(s) and Role: Viktoria Miller MD - Primary Local/Dose/Irrigation:  10 ML 1% LIDOCAINE WITH EPINEPHRINE WAS INJECTED INTO PATIENT'S RIGHT EAR Saline irrigation to sterile field. Peripheral IV 05/20/19 Left Arm (Active) Site Assessment Clean, dry, & intact 5/20/2019 11:38 AM  
Phlebitis Assessment 0 5/20/2019 11:38 AM  
Infiltration Assessment 0 5/20/2019 11:38 AM  
Dressing Status New 5/20/2019 11:38 AM  
Dressing Type Tape;Transparent 5/20/2019 11:38 AM  
Hub Color/Line Status Pink 5/20/2019 11:38 AM  
        
Airway - Endotracheal Tube 05/20/19 Oral (Active) Dressing/Packing:  Wound Ear Right-Dressing Type: Ear dressing;Ear protector (05/20/19 8077) Splint/Cast:  ] Other:  Patient has two wounds on his lower back, one scab on his left knee and one scab on his right forearm prior to surgery PATIENT URINATED ON STRETCHER PRIOR TO MOVING TO OR BED

## 2019-05-20 NOTE — DISCHARGE INSTRUCTIONS
Patient Education     Wound Care: After Your Visit  Your Care Instructions  Taking good care of your wound at home will help it heal quickly and reduce your chance of infection. The doctor has checked you carefully, but problems can develop later. If you notice any problems or new symptoms, get medical treatment right away. Follow-up care is a key part of your treatment and safety. Be sure to make and go to all appointments, and call your doctor if you are having problems. It's also a good idea to know your test results and keep a list of the medicines you take. How can you care for yourself at home? · Clean the area with soap and water 2 times a day unless your doctor gives you different instructions. Don't use hydrogen peroxide or alcohol, which can slow healing. ¨ You may cover the wound with a thin layer of antibiotic ointment, such as bacitracin, and a nonstick bandage. ¨ Apply more ointment and replace the bandage as needed. · Take pain medicines exactly as directed. Some pain is normal with a wound, but do not ignore pain that is getting worse instead of better. You could have an infection. ¨ If the doctor gave you a prescription medicine for pain, take it as prescribed. ¨ If you are not taking a prescription pain medicine, ask your doctor if you can take an over-the-counter medicine. · Your doctor may have closed your wound with stitches (sutures), staples, or skin glue. ¨ If you have stitches, your doctor may remove them after several days to 2 weeks. Or you may have stitches that dissolve on their own. ¨ If you have staples, your doctor may remove them after 7 to 10 days. ¨ If your wound was closed with skin glue, the glue will wear off in a few days to 2 weeks. When should you call for help? Call your doctor now or seek immediate medical care if:  · You have signs of infection, such as:  ¨ Increased pain, swelling, warmth, or redness near the wound.   ¨ Red streaks leading from the wound.  ¨ Pus draining from the wound. ¨ A fever. · You bleed so much from your incision that you soak one or more bandages over 2 to 4 hours. Watch closely for changes in your health, and be sure to contact your doctor if:  · The wound is not getting better each day. Where can you learn more? Go to [employer]  Enter M973 in the search box to learn more about \"Wound Care: After Your Visit. \"   © 5052-2681 Healthwise, Incorporated. Care instructions adapted under license by [facility] (which disclaims liability or warranty for this information). This care instruction is for use with your licensed healthcare professional. If you have questions about a medical condition or this instruction, always ask your healthcare professional. [address] any warranty or liability for your use of this information. Content Version: 15.9.881352;  Last Revised: April 23, 2012

## 2019-05-20 NOTE — PERIOP NOTES
Dr Jose Barragan called patient EKG possible Afib on heart monitor. Orders for stat EKG and cardiology consult. See orders

## 2019-05-20 NOTE — PROGRESS NOTES
Otolaryngology, Head and Neck Surgery The patient is post operative from excision and repair with ongoing swelling and bleedinmg   The patient denies any chest pain or shortness of breath. Hospital Problems  Date Reviewed: 8/20/2018 Codes Class Noted POA Post-op pain ICD-10-CM: G89.18 
ICD-9-CM: 338.18  5/20/2019 Unknown Current Facility-Administered Medications Medication Dose Route Frequency  lactated Ringers infusion  100 mL/hr IntraVENous CONTINUOUS  
 sodium chloride (NS) flush 5-40 mL  5-40 mL IntraVENous Q8H  
 sodium chloride (NS) flush 5-40 mL  5-40 mL IntraVENous PRN  
 morphine 10 mg/ml injection 2 mg  2 mg IntraVENous Multiple  HYDROmorphone (PF) (DILAUDID) injection 0.5 mg  0.5 mg IntraVENous Q30MIN PRN  
 ondansetron (ZOFRAN) injection 4 mg  4 mg IntraVENous PRN  
 midazolam (VERSED) injection 0.5 mg  0.5 mg IntraVENous Q5MIN PRN  
 diphenhydrAMINE (BENADRYL) injection 12.5 mg  12.5 mg IntraVENous PRN  
 dextrose 5% lactated ringers infusion  100 mL/hr IntraVENous CONTINUOUS  
 sodium chloride (NS) flush 5-40 mL  5-40 mL IntraVENous Q8H  
 sodium chloride (NS) flush 5-40 mL  5-40 mL IntraVENous PRN  
 ceFAZolin (ANCEF) 2 g/20 mL in sterile water IV syringe  2 g IntraVENous Q8H Recent Results (from the past 24 hour(s)) EKG, 12 LEAD, INITIAL Collection Time: 05/20/19  3:47 PM  
Result Value Ref Range Ventricular Rate 127 BPM  
 Atrial Rate 133 BPM  
 QRS Duration 84 ms Q-T Interval 354 ms QTC Calculation (Bezet) 514 ms Calculated R Axis 28 degrees Calculated T Axis -57 degrees Diagnosis Atrial fibrillation Marked ST abnormality, possible inferior subendocardial injury Abnormal ECG When compared with ECG of 09-MAY-2019 11:54, Atrial fibrillation has replaced Sinus rhythm Vent. rate has increased BY  73 BPM 
ST now depressed in Inferior leads ST now depressed in Anterior leads T wave inversion now evident in Inferior leads EKG, 12 LEAD, INITIAL Collection Time: 05/20/19  4:40 PM  
Result Value Ref Range Ventricular Rate 71 BPM  
 Atrial Rate 71 BPM  
 P-R Interval 182 ms QRS Duration 84 ms Q-T Interval 418 ms QTC Calculation (Bezet) 454 ms Calculated P Axis 43 degrees Calculated R Axis 29 degrees Calculated T Axis 53 degrees Diagnosis Normal sinus rhythm Normal ECG When compared with ECG of 20-MAY-2019 15:47, Sinus rhythm has replaced Atrial fibrillation Vent. rate has decreased BY  56 BPM 
ST no longer depressed in Inferior leads ST no longer depressed in Anterior leads T wave inversion no longer evident in Inferior leads Visit Vitals BP (!) 179/104 Pulse 79 Temp 98.3 °F (36.8 °C) Resp 14 Ht 6' 2\" (1.88 m) Wt 86.2 kg (190 lb) SpO2 97% BMI 24.39 kg/m² Intake/Output Summary (Last 24 hours) at 5/20/2019 1807 Last data filed at 5/20/2019 1615 Gross per 24 hour Intake 800 ml Output  Net 800 ml Ear: Right incision intact, but continued ooze Gradual swelling gathered with protrusion of ear, despite pressure and surgicel Chest: Clear to auscultation bilaterally. No wheezes or crackles Cardiovascular: Regular rate and rhythm A:  
Hospital Problems  Date Reviewed: 8/20/2018 Codes Class Noted POA Post-op pain ICD-10-CM: G89.18 
ICD-9-CM: 338.18  5/20/2019 Unknown Post op 1.5 hours Plan: 
Surgical wound exploration

## 2019-05-20 NOTE — PERIOP NOTES
Dr Lizette Guzman at bedside - dressing repacked and continued pressure applied. Dr Chrissie Donnelly will return to check on patient

## 2019-05-20 NOTE — H&P
History and Physical 
 
Subjective:  
 
Alejandro Lainez is a 68 y.o.  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 Past Medical History:  
Diagnosis Date  Arthritis  Cancer [facility]) 2011 BCCA,SCCA CHEEK AND NOSE  Chronic pain LEGS  Depression with anxiety  Elevated cholesterol  Essential tremor ADRIAN ARMS- LEFT IS WORSE  GERD (gastroesophageal reflux disease)  h/o Thyroid cancer 2009  
 levothyroxine  Hypertension  Ill-defined condition PARKINSONS DISEASE  Parkinson's disease ([address]  Psychiatric disorder ANXIETY AND DEPRESSION Past Surgical History:  
Procedure Laterality Date  HX GI    
 COLONOSCOPY  
 HX HEENT  2009  
 mass removed from neck  HX ORTHOPAEDIC    
 RIGHT BUNIONECTOMY  HX OTHER SURGICAL  2009 thyroidectomy  HX OTHER SURGICAL    
 MANY BCCA REMOVAL WITH MAC  
 HX OTHER SURGICAL FUNCTIONAL IMPLANT- LEFT CHEST  
 HX RETINAL DETACHMENT REPAIR  1997 Family History Problem Relation Age of Onset  Cancer Mother BCCA  Heart Disease Mother CHF  Alcohol abuse Father  Stroke Father  Hypertension Father  No Known Problems Brother  No Known Problems Son  No Known Problems Daughter  Anesth Problems Neg Hx Social History Tobacco Use  Smoking status: Never Smoker  Smokeless tobacco: Never Used Substance Use Topics  Alcohol use: Yes Comment: OCCASIONALLY Prior to Admission medications Medication Sig Start Date End Date Taking? Authorizing Provider  
carbidopa-levodopa (SINEMET)  mg per tablet Take 1.5 Tabs by mouth three (3) times daily. Yes Provider, Historical  
docusate sodium (COLACE) 100 mg capsule Take 100 mg by mouth daily. Yes Provider, Historical  
ferrous sulfate (IRON) 325 mg (65 mg iron) tablet Take  by mouth Daily (before breakfast).    Yes Provider, Historical  
 potassium chloride (K-DUR, KLOR-CON) 20 mEq tablet Take 40 mEq by mouth daily. Yes Provider, Historical  
potassium chloride (K-DUR, KLOR-CON) 20 mEq tablet Take 20 mEq by mouth nightly. Yes Provider, Historical  
DULoxetine (CYMBALTA) 60 mg capsule Take 60 mg by mouth nightly. Yes Provider, Historical  
fludrocortisone (FLORINEF) 0.1 mg tablet Take 0.2 mg by mouth two (2) times a day. Yes Provider, Historical  
levothyroxine sodium (LEVOTHYROXINE PO) Take 225 mcg by mouth daily. Yes Provider, Historical  
aspirin delayed-release 81 mg tablet Take 81 mg by mouth daily. Provider, Historical  
acetaminophen (TYLENOL) 325 mg tablet Take 650 mg by mouth every six (6) hours as needed for Pain. Provider, Historical  
acetaminophen (TYLENOL EXTRA STRENGTH) 500 mg tablet Take 500 mg by mouth every twelve (12) hours as needed for Pain (NO MORE THAN 4000MG PER DAY). Provider, Historical  
 
No Known Allergies Review of Systems: A comprehensive review of systems was negative except for that written in the History of Present Illness. Objective: Intake and Output:   
No intake/output data recorded. No intake/output data recorded. Physical Exam:  
Visit Vitals /80 (BP 1 Location: Right arm, BP Patient Position: At rest) Pulse (!) 58 Temp 98 °F (36.7 °C) Resp 18 Ht 6' 2\" (1.88 m) Wt 86.2 kg (190 lb) SpO2 100% BMI 24.39 kg/m² General:  Alert, cooperative, no distress, appears stated age. Head:  Normocephalic, without obvious abnormality, atraumatic. Eyes:  Conjunctivae/corneas clear. PERRL, EOMs intact. Fundi benign Ears:  3cm right superior auricular tumor Nose: Nares normal. Septum midline. Mucosa normal. No drainage or sinus tenderness. Throat: Lips, mucosa, and tongue normal. Teeth and gums normal.  
Neck: Supple, symmetrical, trachea midline, no adenopathy, thyroid: no enlargement/tenderness/nodules, no carotid bruit and no JVD. Back:   Symmetric, no curvature. ROM normal. No CVA tenderness. Lungs:   Clear to auscultation bilaterally. Chest wall:  No tenderness or deformity. Heart:  Regular rate and rhythm, S1, S2 normal, no murmur, click, rub or gallop. Extremities: Extremities normal, atraumatic, no cyanosis or edema. Pulses: 2+ and symmetric all extremities. Skin: Skin color, texture, turgor normal. No rashes or lesions Lymph nodes: Cervical, supraclavicular, and axillary nodes normal.  
Neurologic: CNII-XII intact. Normal strength, sensation and reflexes throughout. Data Review: No results found for this or any previous visit (from the past 24 hour(s)). Assessment:  
 
Right metastatic SCC to auricle Plan:  
 
Excision and repair of auricular tumor Signed By: Christin Javier MD   
 May 20, 2019

## 2019-05-20 NOTE — PERIOP NOTES
6:48 PM 
UNIQUE WAS GIVEN TO THE STERILE FIELD TO BE USED BY MD DURING PROCEDURE 
REF: [id number] LOT: [id number] EXP: 06/28/2020 
 
7:02 PM 
SURGICEL WAS GIVEN TO THE STERILE FIELD TO BE USED BY MD DURING PROCEDURE 
REF: [id number] LOT: [id number] EXP: 05/31/2023

## 2019-05-20 NOTE — BRIEF OP NOTE
BRIEF OPERATIVE NOTE Date of Procedure: 5/20/2019 Preoperative Diagnosis: SQUAMOUS CELL CARCINOMA, EAR, RIGHT Postoperative Diagnosis: SQUAMOUS CELL CARCINOMA, EAR, RIGHT Procedure(s): 
 AURICULECTOMY OF RIGHT EAR AND REARRANGEMENT OF TISSUE Right auricular rim excision and chondrocutaneous flap repair Surgeon(s) and Role: Marijo Eisenmenger, MD - Primary Surgical Assistant:  Surgical Staff: 
Circ-1: Adele Stanton RN Scrub Tech-1: Guero López Surg Asst-1: Galen Jauregui Event Time In Time Out Incision Start [phone] Incision Close 1451 Anesthesia: General  
Estimated Blood Loss: 20ml Specimens:  
ID Type Source Tests Collected by Time Destination 1 : right lateral helical skin cancer Frozen Section Ear, Right  Kal Hoff MD 5/20/2019 1401 Pathology 2 : RIGHT AURICULECTOMY SPECIMEN Fresh Ear, Right  Kal Hoff MD 5/20/2019 1413 Pathology 3 : INFERIOR MARGIN Frozen Section Ear, Right  Kal Hoff MD 5/20/2019 1418 Pathology 4 : SUPERIOR MARGIN Frozen Section Ear, Right  Kal Hoff MD 5/20/2019 1419 Pathology 5 : LATERAL MARGIN Frozen Section Ear, Right  Kal Hoff MD 5/20/2019 1420 Pathology 6 : MEDIAL MARGIN Frozen Section Ear, Right  Kal Hoff MD 5/20/2019 1421 Pathology 7 : DEEP MARGIN Frozen Section Ear, Right  Kal Hoff MD 5/20/2019 1423 Pathology Findings:   
Complications: 0 Implants: * No implants in log *

## 2019-05-20 NOTE — ANESTHESIA POSTPROCEDURE EVALUATION
Procedure(s): 
PARTIAL AURICULECTOMY OF RIGHT EAR AND REARRANGEMENT OF TISSUE. general 
 
Anesthesia Post Evaluation Patient location during evaluation: PACU Note status: Adequate. Level of consciousness: responsive to verbal stimuli and sleepy but conscious Pain management: satisfactory to patient Airway patency: patent Anesthetic complications: no 
Cardiovascular status: acceptable Respiratory status: acceptable Hydration status: acceptable Comments: +Post-Anesthesia Evaluation and Assessment Patient: Rachel Candelario MRN: [id number]  SSN: [id number] YOB: 1945  Age: 68 y.o. Sex: male Cardiovascular Function/Vital Signs /75   Pulse (!) 113   Temp 36.8 °C (98.3 °F)   Resp 13   Ht 6' 2\" (1.88 m)   Wt 86.2 kg (190 lb)   SpO2 95%   BMI 24.39 kg/m² Patient is status post Procedure(s): 
PARTIAL AURICULECTOMY OF RIGHT EAR AND REARRANGEMENT OF TISSUE. Nausea/Vomiting: Controlled. Postoperative hydration reviewed and adequate. Pain: 
Pain Scale 1: Numeric (0 - 10) (05/20/19 1515) Pain Intensity 1: 0 (05/20/19 1515) Managed. Neurological Status:  
Neuro (WDL): Exceptions to WDL (05/20/19 1515) At baseline. Mental Status and Level of Consciousness: Arousable. Pulmonary Status:  
O2 Device: Room air (05/20/19 1520) Adequate oxygenation and airway patent. Complications related to anesthesia: None Post-anesthesia assessment completed. No concerns. I have evaluated the patient and the patient is stable and ready to be discharged from PACU . Signed By: Rock Benjie MD  
 5/20/2019 Vitals Value Taken Time /52 5/20/2019  3:45 PM  
Temp 36.8 °C (98.3 °F) 5/20/2019  3:15 PM  
Pulse 120 5/20/2019  3:55 PM  
Resp 14 5/20/2019  3:55 PM  
SpO2 94 % 5/20/2019  3:55 PM  
Vitals shown include unvalidated device data.

## 2019-05-20 NOTE — CONSULTS
Cardiology Consult Note    CC: PAF  Reason for consult:  Afib  Requesting MD:  Dr. Carter Hong     Subjective:      Date of  Admission: 5/20/2019  9:42 AM     Admission type:Elective    Brandy Lopez is a 68 y.o. male admitted for [address], EAR, RIGHT. Patient was in PACU when I saw him. He was in sinus tachy when he was brought back from his Rt ear surgery to remove SCCA. He rhythm went into then Afib for 15 minutes with HR in 120s and then converted to sinus on its own. Pt denies any prior cardiac history and never saw any cardiologist. We initially thought he has a pacemaker but it turns out to be brain stimulator for his Parkinson's disease. He denies any CP or SOB or dizziness. His ekg during return of sinus shows no ST changes and unchanged as compared to pre-op ekg.     Patient Active Problem List    Diagnosis Date Noted    Basal cell carcinoma (BCC) of supratip of nose 07/26/2018    Basal cell carcinoma of skin of other and unspecified parts of face 04/02/2015    Squamous cell carcinoma of skin of other and unspecified parts of face 04/02/2015    Other and unspecified malignant neoplasm of skin of other and unspecified parts of face 09/02/2011    Other and unspecified malignant neoplasm of skin of ear and external auditory canal 09/02/2011    Other and unspecified malignant neoplasm of skin of other and unspecified parts of face 08/02/2011    Other and unspecified malignant neoplasm of skin of ear and external auditory canal 08/02/2011      Audi Merino MD  Past Medical History:   Diagnosis Date    Arthritis     Cancer ([facility] [address] 2011    BCCA,SCCA CHEEK AND NOSE    Chronic pain     LEGS    Depression with anxiety     Elevated cholesterol     Essential tremor     ADRIAN ARMS- LEFT IS WORSE    GERD (gastroesophageal reflux disease)     h/o Thyroid cancer 2009    levothyroxine    Hypertension     Ill-defined condition     PARKINSONS DISEASE    Parkinson's disease ([address]     Psychiatric disorder     ANXIETY AND DEPRESSION      Past Surgical History:   Procedure Laterality Date    HX GI      COLONOSCOPY    HX HEENT  2009    mass removed from neck    HX ORTHOPAEDIC      RIGHT BUNIONECTOMY    HX OTHER SURGICAL  2009    thyroidectomy    HX OTHER SURGICAL      MANY BCCA REMOVAL WITH MAC    HX OTHER SURGICAL      FUNCTIONAL IMPLANT- LEFT CHEST    HX RETINAL DETACHMENT REPAIR  1997     No Known Allergies   Family History   Problem Relation Age of Onset    Cancer Mother         BCCA    Heart Disease Mother         CHF    Alcohol abuse Father     Stroke Father     Hypertension Father     No Known Problems Brother     No Known Problems Son     No Known Problems Daughter     Anesth Problems Neg Hx       No current facility-administered medications for this encounter. Prior to Admission Medications:  Prior to Admission medications    Medication Sig Start Date End Date Taking? Authorizing Provider   cephALEXin (KEFLEX) 250 mg capsule Take 2 Caps by mouth three (3) times daily for 7 days. 5/20/19 5/27/19 Yes Anusha Martínez MD   HYDROcodone-acetaminophen [facility]) 7.5-325 mg per tablet Take 2 Tabs by mouth every six (6) hours as needed for Pain for up to 30 doses. Max Daily Amount: 8 Tabs. Indications: Pain 5/20/19 5/27/19 Yes Anusha Martínez MD   HYDROcodone-acetaminophen [facility]) 7.5-325 mg per tablet Take 2 Tabs by mouth every six (6) hours as needed for Pain for up to 30 doses. Max Daily Amount: 8 Tabs. Indications: Pain 5/20/19 5/27/19 Yes Anusha Martínez MD   cephALEXin [facility]) 500 mg capsule Take 1 Cap by mouth three (3) times daily for 7 days. 5/20/19 5/27/19 Yes Anusha Martínez MD   carbidopa-levodopa (SINEMET)  mg per tablet Take 1.5 Tabs by mouth three (3) times daily. Yes Provider, Historical   docusate sodium (COLACE) 100 mg capsule Take 100 mg by mouth daily.    Yes Provider, Historical   ferrous sulfate (IRON) 325 mg (65 mg iron) tablet Take  by mouth Daily (before breakfast). Yes Provider, Historical   potassium chloride (K-DUR, KLOR-CON) 20 mEq tablet Take 40 mEq by mouth daily. Yes Provider, Historical   potassium chloride (K-DUR, KLOR-CON) 20 mEq tablet Take 20 mEq by mouth nightly. Yes Provider, Historical   DULoxetine (CYMBALTA) 60 mg capsule Take 60 mg by mouth nightly. Yes Provider, Historical   fludrocortisone (FLORINEF) 0.1 mg tablet Take 0.2 mg by mouth two (2) times a day. Yes Provider, Historical   levothyroxine sodium (LEVOTHYROXINE PO) Take 225 mcg by mouth daily. Yes Provider, Historical   aspirin delayed-release 81 mg tablet Take 81 mg by mouth daily. Provider, Historical   acetaminophen (TYLENOL) 325 mg tablet Take 650 mg by mouth every six (6) hours as needed for Pain. Provider, Historical   acetaminophen (TYLENOL EXTRA STRENGTH) 500 mg tablet Take 500 mg by mouth every twelve (12) hours as needed for Pain (NO MORE THAN 4000MG PER DAY). Provider, Historical        Review of Symptoms:  Except as noted in HPI, patient denies recent fever or chills, nausea, vomiting, diarrhea, hemoptysis, hematemesis, dysuria, myalgias, focal neurologic symptoms, ecchymosis, angioedema, odynophagia, dysphagia, sore throat, earache,rash, melena, hematochezia, depression, GERD, cold intolerance, petechia, bleeding gums, or significant weight loss. Review of systems not obtained due to patient factors.      Subjective:    24 hr VS reviewed, overall VSSAF  Temp (24hrs), Av.2 °F (36.8 °C), Min:98 °F (36.7 °C), Max:98.3 °F (36.8 °C)    Patient Vitals for the past 8 hrs:   Pulse   19 1615 (!) 117   19 1600 (!) 119   19 1545 (!) 117   19 1530 (!) 113   19 1520 (!) 113   19 1515 (!) 114   19 1510 (!) 118   19 1509 (!) 119   19 1117 (!) 58    Patient Vitals for the past 8 hrs:   Resp   19 1615 14   19 1600 15   19 1545 15   19 1530 13   19 1520 14   19 1515 16   19 1510 17   05/20/19 1509 18   05/20/19 1117 18    Patient Vitals for the past 8 hrs:   BP   05/20/19 1615 134/90   05/20/19 1600 116/74   05/20/19 1545 132/52   05/20/19 1530 141/75   05/20/19 1520 99/65   05/20/19 1515 98/73   05/20/19 1510 100/84   05/20/19 1509 129/83   05/20/19 1508 129/83   05/20/19 1117 153/80          Intake/Output Summary (Last 24 hours) at 5/20/2019 1656  Last data filed at 5/20/2019 1615  Gross per 24 hour   Intake 800 ml   Output    Net 800 ml         Physical Exam (complete single organ system exam)      Visit Vitals  /90   Pulse (!) 117   Temp 98.3 °F (36.8 °C)   Resp 14   Ht 6' 2\" (1.88 m)   Wt 190 lb (86.2 kg)   SpO2 94%   BMI 24.39 kg/m²     General Appearance:  Well developed, well nourished,alert and oriented x 3, and individual in no acute distress. Ears/Nose/Mouth/Throat:   Hearing grossly normal.         Neck: Supple. Chest:   Lungs clear to auscultation bilaterally. Cardiovascular:  Regular rate and rhythm, S1, S2 normal, no murmur. Abdomen:   Soft, non-tender, bowel sounds are active. Extremities: No edema bilaterally. Skin: Warm and dry. Cardiographics    Telemetry: normal sinus rhythm  ECG:  atrial fibrillation, rate 120s  Echocardiogram: Not done    Labs:   Recent Results (from the past 24 hour(s))   EKG, 12 LEAD, INITIAL    Collection Time: 05/20/19  3:47 PM   Result Value Ref Range    Ventricular Rate 127 BPM    Atrial Rate 133 BPM    QRS Duration 84 ms    Q-T Interval 354 ms    QTC Calculation (Bezet) 514 ms    Calculated R Axis 28 degrees    Calculated T Axis -57 degrees    Diagnosis       Atrial fibrillation  Marked ST abnormality, possible inferior subendocardial injury  Abnormal ECG  When compared with ECG of 09-MAY-2019 11:54,  Atrial fibrillation has replaced Sinus rhythm  Vent.  rate has increased BY  73 BPM  ST now depressed in Inferior leads  ST now depressed in Anterior leads  T wave inversion now evident in Inferior leads EKG, 12 LEAD, INITIAL    Collection Time: 05/20/19  4:40 PM   Result Value Ref Range    Ventricular Rate 71 BPM    Atrial Rate 71 BPM    P-R Interval 182 ms    QRS Duration 84 ms    Q-T Interval 418 ms    QTC Calculation (Bezet) 454 ms    Calculated P Axis 43 degrees    Calculated R Axis 29 degrees    Calculated T Axis 53 degrees    Diagnosis       Normal sinus rhythm  Normal ECG  When compared with ECG of 20-MAY-2019 15:47,  Sinus rhythm has replaced Atrial fibrillation  Vent. rate has decreased BY  56 BPM  ST no longer depressed in Inferior leads  ST no longer depressed in Anterior leads  T wave inversion no longer evident in Inferior leads          Assessment:     Assessment:   Brief; Afib; post Afib related to anesthesia and surgery; self-converted to sinus     Plan:   He can go home and I would be glad to follow up at my office within two weeks. No need for medication unless this problem recurs.     Konstantin Zarco MD

## 2019-05-21 VITALS
TEMPERATURE: 98.1 F | HEART RATE: 73 BPM | DIASTOLIC BLOOD PRESSURE: 77 MMHG | SYSTOLIC BLOOD PRESSURE: 144 MMHG | HEIGHT: 74 IN | WEIGHT: 190 LBS | OXYGEN SATURATION: 92 % | RESPIRATION RATE: 18 BRPM | BODY MASS INDEX: 24.38 KG/M2

## 2019-05-21 LAB
ATRIAL RATE: 133 BPM
ATRIAL RATE: 71 BPM
CALCULATED P AXIS, ECG09: 43 DEGREES
CALCULATED R AXIS, ECG10: 28 DEGREES
CALCULATED R AXIS, ECG10: 29 DEGREES
CALCULATED T AXIS, ECG11: -57 DEGREES
CALCULATED T AXIS, ECG11: 53 DEGREES
DIAGNOSIS, 93000: NORMAL
DIAGNOSIS, 93000: NORMAL
P-R INTERVAL, ECG05: 182 MS
Q-T INTERVAL, ECG07: 354 MS
Q-T INTERVAL, ECG07: 418 MS
QRS DURATION, ECG06: 84 MS
QRS DURATION, ECG06: 84 MS
QTC CALCULATION (BEZET), ECG08: 454 MS
QTC CALCULATION (BEZET), ECG08: 514 MS
VENTRICULAR RATE, ECG03: 127 BPM
VENTRICULAR RATE, ECG03: 71 BPM

## 2019-05-21 PROCEDURE — 74011250637 HC RX REV CODE- 250/637: Performed by: OTOLARYNGOLOGY

## 2019-05-21 PROCEDURE — 74011000250 HC RX REV CODE- 250

## 2019-05-21 PROCEDURE — 74011250636 HC RX REV CODE- 250/636: Performed by: OTOLARYNGOLOGY

## 2019-05-21 PROCEDURE — 99218 HC RM OBSERVATION: CPT

## 2019-05-21 PROCEDURE — 94760 N-INVAS EAR/PLS OXIMETRY 1: CPT

## 2019-05-21 PROCEDURE — 77030011256 HC DRSG MEPILEX <16IN NO BORD MOLN -A

## 2019-05-21 RX ORDER — BACITRACIN 500 UNIT/G
PACKET (EA) TOPICAL
Status: COMPLETED
Start: 2019-05-21 | End: 2019-05-21

## 2019-05-21 RX ADMIN — MORPHINE SULFATE 2 MG: 2 INJECTION, SOLUTION INTRAMUSCULAR; INTRAVENOUS at 03:57

## 2019-05-21 RX ADMIN — Medication 10 ML: at 13:42

## 2019-05-21 RX ADMIN — OXYCODONE AND ACETAMINOPHEN 1 TABLET: 5; 325 TABLET ORAL at 11:44

## 2019-05-21 RX ADMIN — OXYCODONE AND ACETAMINOPHEN 1 TABLET: 5; 325 TABLET ORAL at 01:12

## 2019-05-21 RX ADMIN — Medication 10 ML: at 06:00

## 2019-05-21 RX ADMIN — BACITRACIN: 500 OINTMENT TOPICAL at 10:00

## 2019-05-21 NOTE — PROGRESS NOTES
Chart reviewed, informed that patient resides in assisted living at [facility] ([phone]).  Cm contacted them to advise of discharge; patients' brother will transport at 6 pm.  ARI Partida

## 2019-05-21 NOTE — PROGRESS NOTES
1730- Update on patient's discharge called to [facility] residential. Spoke with receiving Kim RN and receiving facility. 1815- I have reviewed discharge instructions with the patient and brother. The patient and brother verbalized understanding. Patient leaving in wheelchair with brother and returning to [employer] not working.  Hard copy signed and placed in chart

## 2019-05-21 NOTE — WOUND CARE
WOCN Note:  
New consult for sacrum. Chart shows: 
Admitted for squamous cell cancer with right ear auriculectomy and flap - managed by Dr. Nai Bird. Assessment:  
Communicative and able to turn independently for assessment. Cleaned of urinary incontinence. Bed: total care Patient reports no pain. Bilateral heel, buttocks, and sacral skin intact and without erythema. POA mid back partial thickness wound = 2 x 1.3 x 0.1 cm 100% moist blanching red. Petroleum ointment and Mepilex border applied. POA, lower back with raised, soft, blanching ?hemangioma = 5 x 5 x 0 cm  There are linear splits but no exudate. Mepilex border applied. Recommendations:   
Keep both areas on back covered with Mepilex border & change as needed. Transition of Care: Plan to follow weekly and as needed while admitted to hospital.   
 
GIRISH ConnerN, RN, [facility] Certified Wound, Ostomy, Continence Nurse 
office [phone] 
pager [phone] or call  to page

## 2019-05-21 NOTE — PROGRESS NOTES
Otolaryngology, Head and Neck Surgery        The patient is post operative day 1 from right auriculectomy. he is doing well and is tolerating a diet and the pain is under control. The patient denies any chest pain or shortness of breath. Hospital Problems  Date Reviewed: 8/20/2018          Codes Class Noted POA    Post-op pain ICD-10-CM: G89.18  ICD-9-CM: 338.18  5/20/2019 Unknown              Current Facility-Administered Medications   Medication Dose Route Frequency    sodium chloride (NS) flush 5-40 mL  5-40 mL IntraVENous Q8H    sodium chloride (NS) flush 5-40 mL  5-40 mL IntraVENous PRN    oxyCODONE-acetaminophen (PERCOCET) 5-325 mg per tablet 1 Tab  1 Tab Oral Q4H PRN    morphine injection 2 mg  2 mg IntraVENous Q4H PRN       No results found for this or any previous visit (from the past 24 hour(s)). Visit Vitals  /77 (BP 1 Location: Right arm, BP Patient Position: At rest)   Pulse 73   Temp 98.1 °F (36.7 °C)   Resp 18   Ht 6' 2\" (1.88 m)   Wt 86.2 kg (190 lb)   SpO2 92%   BMI 24.39 kg/m²       Intake/Output Summary (Last 24 hours) at 5/21/2019 1820  Last data filed at 5/21/2019 1342  Gross per 24 hour   Intake 2130 ml   Output 320 ml   Net 1810 ml       The patient is a well developed, well nourished adult in no distress    Ear: incision intact, no swelling or increased erythema or induration      Chest: Clear to auscultation bilaterally. No wheezes or crackles  Cardiovascular: Regular rate and rhythm    Lower extremities: no calf tenderness    A:   Hospital Problems  Date Reviewed: 8/20/2018          Codes Class Noted POA    Post-op pain ICD-10-CM: G89.18  ICD-9-CM: 338.18  5/20/2019 Unknown                  Plan:  D/c to care facility  Discussed care with brother  Plan on keeping cup dressing overnight  Wound care with vaseline daily for 1-2 weeks. F/u in office in approx 2 weeks.

## 2019-05-21 NOTE — PROGRESS NOTES
Skin dual assessment perform by Isaias Fong (shift nurse) and Dang Morgan (charge nurse). Patient has a left knee abrasion, sacral raised,reddenned, circular lesion. Wound care consult placed.

## 2019-05-21 NOTE — PROGRESS NOTES
Spiritual Care Partner Volunteer visited patient in room 509/01 on 5.21.19. Documented by: : Rev. Zaid Vasques.  Jaclyn Iglesias; [facility], to contact [phone] [address] call: [phone]

## 2019-05-21 NOTE — PERIOP NOTES
TRANSFER - OUT REPORT:    Verbal report given to NURSE BONY(name) on Fátima Andrews  being transferred to [facility](unit) for routine post - op       Report consisted of patients Situation, Background, Assessment and   Recommendations(SBAR). Time Pre op antibiotic given:ANCEF 2 [email or web address]  Anesthesia Stop time: 1927  Hensley Present on Transfer to [address] for Hensley on Chart:NO  Discharge Prescriptions with Chart:NO    Information from the following report(s) SBAR, OR Summary, Intake/Output, MAR, Cardiac Rhythm NSR and Procedure Verification was reviewed with the receiving nurse. Opportunity for questions and clarification was provided. Is the patient on 02? YES       L/Min 2        Is the patient on a monitor? NO    Is the nurse transporting with the patient? NO    Surgical Waiting Area notified of patient's transfer from PACU? YES      The following personal items collected during your admission accompanied patient upon transfer:   Dental Appliance: Dental Appliances: None  Vision: Visual Aid: Glasses  Hearing Aid:    Jewelry: Jewelry: None  Clothing: Clothing: Other (comment)(CLOTHING & SHOES TO PACU)  Other Valuables:  Other Valuables: Eyeglasses, Wheelchair(GLASSES & WHEELCHAIR TO PACU)  Valuables sent to safe:

## 2019-05-21 NOTE — PROGRESS NOTES
Physical Therapy Screening:    [facility] was triggered for physical therapy based on results obtained during the nursing admission assessment. The patients chart was reviewed and the patient is appropriate for a skilled therapy evaluation if there is a decline in functional mobility from baseline. Please order a consult for physical therapy if you are in agreement and would like an evaluation to be completed. Thank you.     Dara Low, PT

## 2019-05-23 NOTE — OP NOTES
[address]   OPERATIVE REPORT    Name:  Refugio Carver  MR#:  [id number]  :  1945  ACCOUNT #:  [de-identified]  DATE OF SERVICE:  2019      PREOPERATIVE DIAGNOSIS:  Postsurgical hemorrhage. POSTOPERATIVE DIAGNOSIS:  Postsurgical hemorrhage. PROCEDURES PERFORMED:  1. Wound exploration and evacuation of hematoma. 2.  Complex closure of auriculectomy, wound defect and fasciocutaneous facial flap reconstruction. SURGEON:  Funmilayo Apple MD    ASSISTANT:  Surgical assistant. ANESTHESIA:  Conscious sedation anesthesia. COMPLICATIONS:  No complications. SPECIMENS REMOVED:  No specimen. IMPLANTS:  No implant. ESTIMATED BLOOD LOSS:  30 mL. INDICATIONS AND FINDINGS:  The patient bled following coughing spell in the recovery room with ongoing eating, not controlled by tamponade hemostasis with a large fluctuant mass over the flap reconstruction of the auriculectomy defect, hence indications for exploration. Bleeding arterial vasculature was identified emanating from the auricular root and external auditory canal region. DETAILS OF PROCEDURE:  In the operating room, the patient was induced on a conscious sedation anesthesia following which he was prepped and draped in a sterile fashion. 1% lidocaine with 1:100,000 part epinephrine was used for local anesthesia. The main advancement front of the fasciocutaneous flap was opened, removing sutures with exposure of large organized hematoma. This was evacuated with blunt dissection and the wound irrigated with bacitracin saline solution. There was active arterial bleeding at the auricular root and external auditory canal area. Significant use of bipolar cautery was done in this entire region and repeated multiple times to achieve hemostasis. The fasciocutaneous flap was then re-closed advancing the flap and insetting and using 4-0 Vicryl in the fascial plane to inset the flap using 3-0 Vicryl under greater tension. A scalp advancement component was further joined to a 3-way flap using a mattress suture of 3-0 Vicryl. 4-0 chromic gut sutures then using in an interrupted and running locked fashion to complete closure of the flap reconstruction. Wounds were cleaned and antibiotic ointment applied and a Hand compression cup dressing applied and the patient handed over to Anesthesia for awakening and transfer to the recovery room.       Refugio Kayser, MD [id number]  D:  05/22/2019 18:09  T:  05/23/2019 0:54  JOB #:  [id number]  CC:  [id number]  [facility] [place]

## 2019-05-23 NOTE — OP NOTES
[address]   OPERATIVE REPORT    Name:  Mela Yates  MR#:  [id number]  :  1945  ACCOUNT #:  [de-identified]  DATE OF SERVICE:  2019    PREOPERATIVE DIAGNOSES:  1. 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 2.  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 POSTOPERATIVE DIAGNOSES:  1. 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 2.  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 PROCEDURES PERFORMED:  1. Right auriculectomy of squamous cell carcinoma. 2.  Right partial auriculectomy for basal cell carcinoma of the lateral helix. 3.  Chondrocutaneous flap reconstruction of the right helix and lobe:  3 x 2 cm. 4.  Musculocutaneous facial flap auricular reconstruction for auriculectomy:  8 x 5 cm. SURGEON:  Shilpi Martines MD    ASSISTANT:  Surgical assistant. ANESTHESIA:  General endotracheal tube anesthesia. COMPLICATIONS:  No complication. SPECIMENS REMOVED:  As above. IMPLANTS:  No implants. ESTIMATED BLOOD LOSS:  20 mL. INDICATIONS AND FINDINGS:  The patient had both a squamous cell in the deep auricle and facial region in addition to right lateral basal cell lesion on the lateral helix requiring at first partial auriculectomy for basal cell and total auriculectomy with subfascial facial resection for squamous cell causing an auriculectomy wound defect and facial wound defect requiring flap reconstruction and hence indications. DETAILS OF PROCEDURE:  In the operating room, the patient was induced under general endotracheal tube anesthesia following which he was prepped and draped in a sterile fashion. A 1% lidocaine with 1:100,000 part epinephrine was used for local infiltration at all sites.     The margin was first marked out around the right helical lesion 5 mm incising en bloc through the skin and cartilage of the right lateral helix for partial auriculectomy, marked orientating this with the suture, and handing it off for pathologic analysis. A 5-10 mm margin was then marked out around the subfascial mass at the anterior-superior root of the auricle and zygomatic root area. A 15 blade was used to incise this using Bovie cautery for further dissection. The superior aspect and conchal bowl of the ear was resected down to the deep temporal fascia dissecting circumferentially from the deep subcutaneous attachments and the deep fascial attachments. The inferior and posterior aspect of the cartilaginous canal was spared incising this area to complete the dissection, anteriorly dissecting around the superior aspect of the tragus and skeletonizing the temporal vasculature to complete the dissection, marked orientating the specimen, and handed this off for pathologic analysis. Margins were taken deep and circumferentially obtaining negative margins. First, the residual helical rim was repaired using a chondrocutaneous flap closing in 5 layers using 4-0 PDS in figure-of-eight sutures to close the residual helical cartilage. A 4-0 Vicryl was then used in a deep subcutaneous plane to close the skin in this wedge reconstruction using running and interrupted 4-0 chromic gut sutures in the skin. Fasciocutaneous flap was required for the auriculectomy wound defect  the subfacial SMAS plane in the preauricular area allowing superior lateral rotation advancement based on the temporal artery and vasculature. This was rotated into the wound defect closing inferiorly to the cartilaginous external auditory canal and the residual  of helical rim, closing in 2 layers with 4-0 Vicryl deep and 4-0 plain gut sutures in the skin. This was done circumferentially pulling up to the portion of the scalp inferiorly in the subgaleal plane to assist advancement to complete closure of the auriculectomy wound defect.   The wounds were cleaned and antibiotic ointments applied and a Maeve compression cup applied and the patient then handed over to Anesthesia for awakening and transfer to the recovery room.       Divine Das MD      [id number]  D:  05/22/2019 18:05  T:  05/23/2019 2:26  JOB #:  [id number]  CC:  Sujey Tamayo MD       [address] [facility]

## 2019-08-25 ENCOUNTER — APPOINTMENT (OUTPATIENT)
Dept: CT IMAGING | Age: 74
DRG: 689 | End: 2019-08-25
Attending: STUDENT IN AN ORGANIZED HEALTH CARE EDUCATION/TRAINING PROGRAM
Payer: MEDICARE

## 2019-08-25 ENCOUNTER — APPOINTMENT (OUTPATIENT)
Dept: GENERAL RADIOLOGY | Age: 74
DRG: 689 | End: 2019-08-25
Attending: STUDENT IN AN ORGANIZED HEALTH CARE EDUCATION/TRAINING PROGRAM
Payer: MEDICARE

## 2019-08-25 ENCOUNTER — HOSPITAL ENCOUNTER (INPATIENT)
Age: 74
LOS: 1 days | Discharge: NURSING, REHAB OR LONG-TERM CARE | DRG: 689 | End: 2019-08-27
Attending: STUDENT IN AN ORGANIZED HEALTH CARE EDUCATION/TRAINING PROGRAM | Admitting: INTERNAL MEDICINE
Payer: MEDICARE

## 2019-08-25 DIAGNOSIS — R56.9 SEIZURE (HCC): ICD-10-CM

## 2019-08-25 DIAGNOSIS — R55 SYNCOPE AND COLLAPSE: Primary | ICD-10-CM

## 2019-08-25 DIAGNOSIS — G20 PARKINSON DISEASE (HCC): ICD-10-CM

## 2019-08-25 PROBLEM — Z96.89 S/P DEEP BRAIN STIMULATOR PLACEMENT: Status: ACTIVE | Noted: 2019-08-25

## 2019-08-25 PROBLEM — F41.8 DEPRESSION WITH ANXIETY: Status: ACTIVE | Noted: 2019-08-25

## 2019-08-25 PROBLEM — E03.9 ACQUIRED HYPOTHYROIDISM: Status: ACTIVE | Noted: 2019-08-25

## 2019-08-25 PROBLEM — G89.29 CHRONIC PAIN: Status: ACTIVE | Noted: 2019-08-25

## 2019-08-25 PROBLEM — I10 HYPERTENSION: Status: ACTIVE | Noted: 2019-08-25

## 2019-08-25 LAB
ALBUMIN SERPL-MCNC: 3.2 G/DL (ref 3.5–5)
ALBUMIN/GLOB SERPL: 0.9 {RATIO} (ref 1.1–2.2)
ALP SERPL-CCNC: 155 U/L (ref 45–117)
ALT SERPL-CCNC: 27 U/L (ref 12–78)
AMPHET UR QL SCN: NEGATIVE
ANION GAP SERPL CALC-SCNC: 4 MMOL/L (ref 5–15)
APPEARANCE UR: ABNORMAL
AST SERPL-CCNC: 26 U/L (ref 15–37)
BACTERIA URNS QL MICRO: ABNORMAL /HPF
BARBITURATES UR QL SCN: NEGATIVE
BASOPHILS # BLD: 0.1 K/UL (ref 0–0.1)
BASOPHILS NFR BLD: 1 % (ref 0–1)
BENZODIAZ UR QL: NEGATIVE
BILIRUB SERPL-MCNC: 0.5 MG/DL (ref 0.2–1)
BILIRUB UR QL: NEGATIVE
BUN SERPL-MCNC: 22 MG/DL (ref 6–20)
BUN/CREAT SERPL: 17 (ref 12–20)
CALCIUM SERPL-MCNC: 8.9 MG/DL (ref 8.5–10.1)
CANNABINOIDS UR QL SCN: NEGATIVE
CHLORIDE SERPL-SCNC: 106 MMOL/L (ref 97–108)
CO2 SERPL-SCNC: 31 MMOL/L (ref 21–32)
COCAINE UR QL SCN: NEGATIVE
COLOR UR: ABNORMAL
COMMENT, HOLDF: NORMAL
CREAT SERPL-MCNC: 1.32 MG/DL (ref 0.7–1.3)
DIFFERENTIAL METHOD BLD: ABNORMAL
DRUG SCRN COMMENT,DRGCM: NORMAL
EOSINOPHIL # BLD: 0.2 K/UL (ref 0–0.4)
EOSINOPHIL NFR BLD: 4 % (ref 0–7)
EPITH CASTS URNS QL MICRO: ABNORMAL /LPF
ERYTHROCYTE [DISTWIDTH] IN BLOOD BY AUTOMATED COUNT: 12.7 % (ref 11.5–14.5)
GLOBULIN SER CALC-MCNC: 3.5 G/DL (ref 2–4)
GLUCOSE SERPL-MCNC: 94 MG/DL (ref 65–100)
GLUCOSE UR STRIP.AUTO-MCNC: NEGATIVE MG/DL
HCT VFR BLD AUTO: 36.6 % (ref 36.6–50.3)
HGB BLD-MCNC: 11.9 G/DL (ref 12.1–17)
HGB UR QL STRIP: NEGATIVE
IMM GRANULOCYTES # BLD AUTO: 0 K/UL (ref 0–0.04)
IMM GRANULOCYTES NFR BLD AUTO: 0 % (ref 0–0.5)
KETONES UR QL STRIP.AUTO: NEGATIVE MG/DL
LEUKOCYTE ESTERASE UR QL STRIP.AUTO: ABNORMAL
LYMPHOCYTES # BLD: 0.9 K/UL (ref 0.8–3.5)
LYMPHOCYTES NFR BLD: 17 % (ref 12–49)
MCH RBC QN AUTO: 31.6 PG (ref 26–34)
MCHC RBC AUTO-ENTMCNC: 32.5 G/DL (ref 30–36.5)
MCV RBC AUTO: 97.1 FL (ref 80–99)
METHADONE UR QL: NEGATIVE
MONOCYTES # BLD: 0.9 K/UL (ref 0–1)
MONOCYTES NFR BLD: 17 % (ref 5–13)
NEUTS SEG # BLD: 3.2 K/UL (ref 1.8–8)
NEUTS SEG NFR BLD: 61 % (ref 32–75)
NITRITE UR QL STRIP.AUTO: NEGATIVE
NRBC # BLD: 0 K/UL (ref 0–0.01)
NRBC BLD-RTO: 0 PER 100 WBC
OPIATES UR QL: NEGATIVE
PCP UR QL: NEGATIVE
PH UR STRIP: 8 [PH] (ref 5–8)
PLATELET # BLD AUTO: 351 K/UL (ref 150–400)
PMV BLD AUTO: 10.4 FL (ref 8.9–12.9)
POTASSIUM SERPL-SCNC: 4.4 MMOL/L (ref 3.5–5.1)
PROT SERPL-MCNC: 6.7 G/DL (ref 6.4–8.2)
PROT UR STRIP-MCNC: 100 MG/DL
RBC # BLD AUTO: 3.77 M/UL (ref 4.1–5.7)
RBC #/AREA URNS HPF: ABNORMAL /HPF (ref 0–5)
SAMPLES BEING HELD,HOLD: NORMAL
SODIUM SERPL-SCNC: 141 MMOL/L (ref 136–145)
SP GR UR REFRACTOMETRY: 1.02 (ref 1–1.03)
TROPONIN I SERPL-MCNC: <0.05 NG/ML
TSH SERPL DL<=0.05 MIU/L-ACNC: 0.05 UIU/ML (ref 0.36–3.74)
UR CULT HOLD, URHOLD: NORMAL
UROBILINOGEN UR QL STRIP.AUTO: 0.2 EU/DL (ref 0.2–1)
WBC # BLD AUTO: 5.2 K/UL (ref 4.1–11.1)
WBC URNS QL MICRO: >100 /HPF (ref 0–4)

## 2019-08-25 PROCEDURE — 71046 X-RAY EXAM CHEST 2 VIEWS: CPT

## 2019-08-25 PROCEDURE — 80053 COMPREHEN METABOLIC PANEL: CPT

## 2019-08-25 PROCEDURE — 87186 SC STD MICRODIL/AGAR DIL: CPT

## 2019-08-25 PROCEDURE — 99218 HC RM OBSERVATION: CPT

## 2019-08-25 PROCEDURE — 85025 COMPLETE CBC W/AUTO DIFF WBC: CPT

## 2019-08-25 PROCEDURE — 87077 CULTURE AEROBIC IDENTIFY: CPT

## 2019-08-25 PROCEDURE — 99285 EMERGENCY DEPT VISIT HI MDM: CPT

## 2019-08-25 PROCEDURE — 84484 ASSAY OF TROPONIN QUANT: CPT

## 2019-08-25 PROCEDURE — 87086 URINE CULTURE/COLONY COUNT: CPT

## 2019-08-25 PROCEDURE — 74011250636 HC RX REV CODE- 250/636: Performed by: INTERNAL MEDICINE

## 2019-08-25 PROCEDURE — 81001 URINALYSIS AUTO W/SCOPE: CPT

## 2019-08-25 PROCEDURE — 80307 DRUG TEST PRSMV CHEM ANLYZR: CPT

## 2019-08-25 PROCEDURE — 84443 ASSAY THYROID STIM HORMONE: CPT

## 2019-08-25 PROCEDURE — 70450 CT HEAD/BRAIN W/O DYE: CPT

## 2019-08-25 PROCEDURE — 36415 COLL VENOUS BLD VENIPUNCTURE: CPT

## 2019-08-25 PROCEDURE — 74011250637 HC RX REV CODE- 250/637: Performed by: INTERNAL MEDICINE

## 2019-08-25 PROCEDURE — 93005 ELECTROCARDIOGRAM TRACING: CPT

## 2019-08-25 RX ORDER — LANOLIN ALCOHOL/MO/W.PET/CERES
325 CREAM (GRAM) TOPICAL
Status: DISCONTINUED | OUTPATIENT
Start: 2019-08-26 | End: 2019-08-27 | Stop reason: HOSPADM

## 2019-08-25 RX ORDER — SODIUM CHLORIDE 0.9 % (FLUSH) 0.9 %
5-40 SYRINGE (ML) INJECTION EVERY 8 HOURS
Status: DISCONTINUED | OUTPATIENT
Start: 2019-08-25 | End: 2019-08-27 | Stop reason: HOSPADM

## 2019-08-25 RX ORDER — LANOLIN ALCOHOL/MO/W.PET/CERES
400 CREAM (GRAM) TOPICAL 2 TIMES DAILY
COMMUNITY

## 2019-08-25 RX ORDER — LEVOTHYROXINE SODIUM 100 UG/1
200 TABLET ORAL
Status: DISCONTINUED | OUTPATIENT
Start: 2019-08-26 | End: 2019-08-27 | Stop reason: HOSPADM

## 2019-08-25 RX ORDER — LEVOTHYROXINE SODIUM 200 UG/1
200 TABLET ORAL
COMMUNITY

## 2019-08-25 RX ORDER — LORAZEPAM 2 MG/ML
2 INJECTION INTRAMUSCULAR
Status: DISCONTINUED | OUTPATIENT
Start: 2019-08-25 | End: 2019-08-27 | Stop reason: HOSPADM

## 2019-08-25 RX ORDER — HYDROCODONE BITARTRATE AND ACETAMINOPHEN 7.5; 325 MG/1; MG/1
2 TABLET ORAL
COMMUNITY

## 2019-08-25 RX ORDER — MOMETASONE FUROATE 1 MG/G
CREAM TOPICAL
Status: DISCONTINUED | OUTPATIENT
Start: 2019-08-25 | End: 2019-08-25

## 2019-08-25 RX ORDER — LEVOTHYROXINE SODIUM 25 UG/1
25 TABLET ORAL
COMMUNITY

## 2019-08-25 RX ORDER — MEMANTINE HYDROCHLORIDE 10 MG/1
5 TABLET ORAL DAILY
Status: DISCONTINUED | OUTPATIENT
Start: 2019-08-26 | End: 2019-08-27 | Stop reason: HOSPADM

## 2019-08-25 RX ORDER — ACETAMINOPHEN 325 MG/1
650 TABLET ORAL
Status: DISCONTINUED | OUTPATIENT
Start: 2019-08-25 | End: 2019-08-27 | Stop reason: HOSPADM

## 2019-08-25 RX ORDER — LEVOTHYROXINE SODIUM 50 UG/1
25 TABLET ORAL
Status: DISCONTINUED | OUTPATIENT
Start: 2019-08-26 | End: 2019-08-27 | Stop reason: HOSPADM

## 2019-08-25 RX ORDER — ENOXAPARIN SODIUM 100 MG/ML
40 INJECTION SUBCUTANEOUS EVERY 24 HOURS
Status: DISCONTINUED | OUTPATIENT
Start: 2019-08-25 | End: 2019-08-27 | Stop reason: HOSPADM

## 2019-08-25 RX ORDER — FLUDROCORTISONE ACETATE 0.1 MG/1
0.2 TABLET ORAL 2 TIMES DAILY
Status: DISCONTINUED | OUTPATIENT
Start: 2019-08-25 | End: 2019-08-27 | Stop reason: HOSPADM

## 2019-08-25 RX ORDER — CALCIUM CARBONATE 500(1250)
500 TABLET ORAL DAILY
Status: DISCONTINUED | OUTPATIENT
Start: 2019-08-26 | End: 2019-08-27 | Stop reason: HOSPADM

## 2019-08-25 RX ORDER — SODIUM CHLORIDE 0.9 % (FLUSH) 0.9 %
5-40 SYRINGE (ML) INJECTION AS NEEDED
Status: DISCONTINUED | OUTPATIENT
Start: 2019-08-25 | End: 2019-08-27 | Stop reason: HOSPADM

## 2019-08-25 RX ORDER — CARBIDOPA AND LEVODOPA 25; 100 MG/1; MG/1
1.5 TABLET ORAL 3 TIMES DAILY
Status: DISCONTINUED | OUTPATIENT
Start: 2019-08-25 | End: 2019-08-27 | Stop reason: HOSPADM

## 2019-08-25 RX ORDER — DULOXETIN HYDROCHLORIDE 30 MG/1
60 CAPSULE, DELAYED RELEASE ORAL
Status: DISCONTINUED | OUTPATIENT
Start: 2019-08-25 | End: 2019-08-27 | Stop reason: HOSPADM

## 2019-08-25 RX ORDER — SODIUM CHLORIDE 9 MG/ML
50 INJECTION, SOLUTION INTRAVENOUS CONTINUOUS
Status: DISCONTINUED | OUTPATIENT
Start: 2019-08-25 | End: 2019-08-27 | Stop reason: HOSPADM

## 2019-08-25 RX ORDER — HYDROCODONE BITARTRATE AND ACETAMINOPHEN 5; 325 MG/1; MG/1
1 TABLET ORAL
Status: DISCONTINUED | OUTPATIENT
Start: 2019-08-25 | End: 2019-08-27 | Stop reason: HOSPADM

## 2019-08-25 RX ORDER — MOMETASONE FUROATE 1 MG/G
CREAM TOPICAL
COMMUNITY

## 2019-08-25 RX ORDER — MEMANTINE HYDROCHLORIDE 5 MG/1
5 TABLET ORAL DAILY
COMMUNITY

## 2019-08-25 RX ORDER — MIDODRINE HYDROCHLORIDE 5 MG/1
2.5 TABLET ORAL 2 TIMES DAILY
Status: DISCONTINUED | OUTPATIENT
Start: 2019-08-25 | End: 2019-08-26

## 2019-08-25 RX ORDER — MIDODRINE HYDROCHLORIDE 2.5 MG/1
2.5 TABLET ORAL 2 TIMES DAILY
COMMUNITY

## 2019-08-25 RX ORDER — TRIAMCINOLONE ACETONIDE 1 MG/G
CREAM TOPICAL
Status: DISCONTINUED | OUTPATIENT
Start: 2019-08-25 | End: 2019-08-27 | Stop reason: HOSPADM

## 2019-08-25 RX ORDER — ACETAMINOPHEN 325 MG/1
650 TABLET ORAL
Status: DISCONTINUED | OUTPATIENT
Start: 2019-08-25 | End: 2019-08-25

## 2019-08-25 RX ORDER — CALCIUM CARBONATE 500(1250)
1 TABLET ORAL DAILY
COMMUNITY

## 2019-08-25 RX ADMIN — DULOXETINE HYDROCHLORIDE 60 MG: 30 CAPSULE, DELAYED RELEASE ORAL at 21:05

## 2019-08-25 RX ADMIN — TRIAMCINOLONE ACETONIDE: 1 CREAM TOPICAL at 21:00

## 2019-08-25 RX ADMIN — SODIUM CHLORIDE 75 ML/HR: 900 INJECTION, SOLUTION INTRAVENOUS at 18:41

## 2019-08-25 RX ADMIN — Medication 10 ML: at 21:06

## 2019-08-25 RX ADMIN — ENOXAPARIN SODIUM 40 MG: 40 INJECTION SUBCUTANEOUS at 21:06

## 2019-08-25 RX ADMIN — CARBIDOPA AND LEVODOPA 1.5 TABLET: 25; 100 TABLET ORAL at 21:06

## 2019-08-25 RX ADMIN — Medication 10 ML: at 18:41

## 2019-08-25 NOTE — PROGRESS NOTES
Admission Medication Reconciliation:    Comments/Recommendations:  -Medication history obtained in the emergency department (room 13)  -Med rec completed solely from medication list provided from Andrew Mancini, patient did not participate in interview process  -Confirmed fludrocortisone and midodrine both active medications at [facility] (appears Rx for fludrocortisone has been filled for many months, midodrine may have been a new start on 7/26/19)  -Levothyroxine total daily AM dose = 225 mcg (200 mcg + 25 mcg tablets)  -Memantine 5 mg PO daily x 7 days started on 8/20 with plan to titrate up to 5 mg PO BID on 8/28  -MAR did not provide specific timing of Sinemet administrations    Medications added: Calcium carbonate, mometasone cream, Norco prn, magnesium oxide, midodrine 2.5 mg BID, memantine  Medications removed: Tylenol 500 mg PRN  Medications changed: Levothyroxine dose    Information obtained from: MAR from Andrewjared Mancini    Significant PMH/Disease States:   Past Medical History:   Diagnosis Date    Arthritis     Cancer ([facility] [address] 2011    BCCA,SCCA CHEEK AND NOSE    Chronic pain     LEGS    Depression with anxiety     Elevated cholesterol     Essential tremor     ADRIAN ARMS- LEFT IS WORSE    GERD (gastroesophageal reflux disease)     h/o Thyroid cancer 2009    levothyroxine    Hypertension     Ill-defined condition     PARKINSONS DISEASE    Parkinson's disease [facility])     Psychiatric disorder     ANXIETY AND DEPRESSION       Chief Complaint for this Admission:    Chief Complaint   Patient presents with    Syncope       Allergies:  Patient has no known allergies. Prior to Admission Medications:   Prior to Admission Medications   Prescriptions Last Dose Informant Patient Reported? Taking? DULoxetine (CYMBALTA) 60 mg capsule 8/24/2019 at bedtime  Yes Yes   Sig: Take 60 mg by mouth nightly.    HYDROcodone-acetaminophen (NORCO) 7.5-325 mg per tablet 8/18/2019 at Unknown time  Yes Yes   Sig: Take 2 Tabs by mouth every six (6) hours as needed for Pain. acetaminophen (TYLENOL) 325 mg tablet 8/18/2019 at Unknown time  Yes Yes   Sig: Take 650 mg by mouth every eight (8) hours as needed for Pain. calcium carbonate (OS-FRAN) 500 mg calcium (1,250 mg) tablet 8/25/2019 at AM  Yes Yes   Sig: Take 1 Tab by mouth daily. carbidopa-levodopa (SINEMET)  mg per tablet 8/25/2019 at AM  Yes Yes   Sig: Take 1.5 Tabs by mouth three (3) times daily. docusate sodium (COLACE) 100 mg capsule 8/25/2019 at AM  Yes Yes   Sig: Take 100 mg by mouth daily. ferrous sulfate (IRON) 325 mg (65 mg iron) tablet 8/25/2019 at AM  Yes Yes   Sig: Take 325 mg by mouth Daily (before breakfast). fludrocortisone (FLORINEF) 0.1 mg tablet 8/25/2019 at AM  Yes Yes   Sig: Take 0.2 mg by mouth two (2) times a day. levothyroxine (SYNTHROID) 200 mcg tablet 8/25/2019 at AM  Yes Yes   Sig: Take 200 mcg by mouth Daily (before breakfast). levothyroxine (SYNTHROID) 25 mcg tablet 8/25/2019 at AM  Yes Yes   Sig: Take 25 mcg by mouth Daily (before breakfast). magnesium oxide (MAG-OX) 400 mg tablet 8/25/2019 at AM  Yes Yes   Sig: Take 400 mg by mouth two (2) times a day. memantine (NAMENDA) 5 mg tablet 8/25/2019 at AM  Yes Yes   Sig: Take 5 mg by mouth daily. midodrine (PROAMITINE) 2.5 mg tablet 8/25/2019 at AM  Yes Yes   Sig: Take 2.5 mg by mouth two (2) times a day. mometasone (ELOCON) 0.1 % topical cream 8/24/2019 at bedtime  Yes Yes   Sig: Apply  to affected area nightly. Apply to right ear topically at bedtime related to basal cell carcinoma of skin   potassium chloride (K-DUR, KLOR-CON) 20 mEq tablet 8/25/2019 at AM  Yes Yes   Sig: Take 40 mEq by mouth daily. potassium chloride (K-DUR, KLOR-CON) 20 mEq tablet 8/24/2019 at bedtime  Yes Yes   Sig: Take 20 mEq by mouth nightly.       Facility-Administered Medications: None     Thank you,  Neelam Vines, PHARMD

## 2019-08-25 NOTE — ED NOTES
Pt Throughput: Charge Nurse on [facility]  made aware of patient's bed assignment, room 317. Delaney Silver RN  Emergency Dept Charge RN.

## 2019-08-25 NOTE — PROGRESS NOTES
TRANSFER - IN REPORT:    Verbal report received from GHADA(name) on Elif Rondon  being received from ED(unit) for routine progression of care      Report consisted of patients Situation, Background, Assessment and   Recommendations(SBAR). Information from the following report(s) SBAR, Kardex and ED Summary was reviewed with the receiving nurse. Opportunity for questions and clarification was provided. Assessment completed upon patients arrival to unit and care assumed. 1600: pt refusing to allow nurses to flush iv, perform dual skin, physical assessment and change iv site. 1605: bed alarm placed at this time    1730: bed alarm rang out. \"I have to piss\" assisted pt with urinal. Bed alarm back on.     1840: pt refusing STAND test at this time.  Refusing to take meds \"I don't want them\"

## 2019-08-25 NOTE — ROUTINE PROCESS
TRANSFER - OUT REPORT:    Verbal report given to Odalis(name) on Rosemarie Foots  being transferred to [facility](unit) for routine progression of care       Report consisted of patients Situation, Background, Assessment and   Recommendations(SBAR). Information from the following report(s) SBAR, ED Summary, MAR, Recent Results and Cardiac Rhythm junctional was reviewed with the receiving nurse. Lines:   Peripheral IV 08/25/19 Left Forearm (Active)   Site Assessment Clean, dry, & intact 8/25/2019 12:58 PM   Phlebitis Assessment 0 8/25/2019 12:58 PM   Infiltration Assessment 0 8/25/2019 12:58 PM   Dressing Status Clean, dry, & intact 8/25/2019 12:58 PM   Dressing Type Tape;Transparent 8/25/2019 12:58 PM   Hub Color/Line Status Green;Flushed;Patent 8/25/2019 12:58 PM   Action Taken Blood drawn 8/25/2019 12:58 PM   Alcohol Cap Used Yes 8/25/2019 12:58 PM        Opportunity for questions and clarification was provided.       Patient transported with:   Monitor  Registered Nurse

## 2019-08-25 NOTE — ED TRIAGE NOTES
Pt arrives via EMS from [facility] after  witnessed pt arms extending outward, shaking, not responding to verbal stimuli. Pt does not remember this episode.

## 2019-08-25 NOTE — ED PROVIDER NOTES
The patient is a 71-year-old male with a prior history of Parkinson's with a deep brain stimulator as well as cancer to the right ear in the process of radiation therapy presenting to the emergency department today secondary to syncope with possible seizure. Patient does not recall the events today. Currently he feels okay has no complaints. Denies recent illness with fever, chills, shortness of breath, cough, chest pain vomiting or diarrhea. Per family they were called by his nursing facility where he was noted to have a syncopal episode and have as many as 4 seizures. The description of the seizures are unknown. He has no known history of seizures. Right now he is a symptomatic and without complaint. He did become incontinent but denies tongue biting.            Past Medical History:   Diagnosis Date    Arthritis     Cancer ([facility] [address] 2011    BCCA,SCCA CHEEK AND NOSE    Chronic pain     LEGS    Depression with anxiety     Elevated cholesterol     Essential tremor     ADRIAN ARMS- LEFT IS WORSE    GERD (gastroesophageal reflux disease)     h/o Thyroid cancer 2009    levothyroxine    Hypertension     Ill-defined condition     PARKINSONS DISEASE    Parkinson's disease ([facility] [address]     Psychiatric disorder     ANXIETY AND DEPRESSION       Past Surgical History:   Procedure Laterality Date    HX GI      COLONOSCOPY    HX HEENT  2009    mass removed from neck    HX ORTHOPAEDIC      RIGHT BUNIONECTOMY    HX OTHER SURGICAL  2009    thyroidectomy    HX OTHER SURGICAL      MANY BCCA REMOVAL WITH MAC    HX OTHER SURGICAL      FUNCTIONAL IMPLANT- LEFT CHEST    HX RETINAL DETACHMENT REPAIR  1997         Family History:   Problem Relation Age of Onset    Cancer Mother         BCCA    Heart Disease Mother         CHF    Alcohol abuse Father     Stroke Father     Hypertension Father     No Known Problems Brother     No Known Problems Son     No Known Problems Daughter     Anesth Problems Neg Hx        Social History     Socioeconomic History    Marital status: SINGLE     Spouse name: Not on file    Number of children: Not on file    Years of education: Not on file    Highest education level: Not on file   Occupational History    Not on file   Social Needs    Financial resource strain: Not on file    Food insecurity:     Worry: Not on file     Inability: Not on file    Transportation needs:     Medical: Not on file     Non-medical: Not on file   Tobacco Use    Smoking status: Never Smoker    Smokeless tobacco: Never Used   Substance and Sexual Activity    Alcohol use: Yes     Comment: OCCASIONALLY    Drug use: No    Sexual activity: Not on file   Lifestyle    Physical activity:     Days per week: Not on file     Minutes per session: Not on file    Stress: Not on file   Relationships    Social connections:     Talks on phone: Not on file     Gets together: Not on file     Attends Gnosticism service: Not on file     Active member of club or organization: Not on file     Attends meetings of clubs or organizations: Not on file     Relationship status: Not on file    Intimate partner violence:     Fear of current or ex partner: Not on file     Emotionally abused: Not on file     Physically abused: Not on file     Forced sexual activity: Not on file   Other Topics Concern    Not on file   Social History Narrative    Not on file         ALLERGIES: Patient has no known allergies. Review of Systems   Constitutional: Negative for chills and fever. HENT: Negative for congestion and sore throat. Eyes: Negative for pain and redness. Respiratory: Negative for cough and shortness of breath. Cardiovascular: Negative for chest pain and palpitations. Gastrointestinal: Negative for abdominal pain, diarrhea, nausea and vomiting. Genitourinary: Negative for frequency and hematuria. Musculoskeletal: Negative for back pain and neck pain. Skin: Negative for rash and wound.    Neurological: Positive for tremors, seizures and syncope. Negative for dizziness and headaches. Hematological: Does not bruise/bleed easily. Vitals:    08/25/19 1256 08/25/19 1300   BP: 105/52 120/59   Pulse: 62 61   Resp: 16 14   Temp: 98.3 °F (36.8 °C)    SpO2: 100% 100%   Weight: 73.9 kg (163 lb)    Height: 6' 2\" (1.88 m)             Physical Exam   Constitutional: He is oriented to person, place, and time. He appears well-developed and well-nourished. No distress. HENT:   Head: Normocephalic and atraumatic. Mouth/Throat: Oropharynx is clear and moist.   Erythema to R ear c/w radiation therapy   Eyes: Pupils are equal, round, and reactive to light. Conjunctivae and EOM are normal.   Neck: Normal range of motion. Neck supple. Cardiovascular: Normal rate, regular rhythm, normal heart sounds and intact distal pulses. Exam reveals no gallop and no friction rub. No murmur heard. Pulmonary/Chest: Effort normal and breath sounds normal. No respiratory distress. He has no wheezes. He has no rales. Abdominal: Soft. Bowel sounds are normal. He exhibits no distension. There is no tenderness. There is no rebound and no guarding. Musculoskeletal: Normal range of motion. He exhibits no edema. Neurological: He is alert and oriented to person, place, and time. Tremor to L extremities  No facial asymmetry  Moves all 4 extremities  Sensation intact throughout   Skin: Skin is warm and dry. Capillary refill takes less than 2 seconds. No rash noted. Psychiatric: He has a normal mood and affect. Nursing note and vitals reviewed.          EKG Interpretation:   ED Physician interpretation  Sinus pedro 59, poor baseline but appears to have normal intervals, no acute ischemic changes     Labs Reviewed:   No leukocytosis, mild anemia  Normal sodium and K  Baseline renal function  Trop ok  UA ordered but not yet resulted at time of admit      Imaging Reviewed:   [facility] shows DBS but no acute process      Course:  Hospitalist Vane for Admission  3:21 PM    ED Room Number: PW16/75  Patient Name and age:  Cleo Henriquez 68 y.o.  male  Working Diagnosis:   1. Syncope and collapse    2. Seizure ([address]      Readmission: no  Isolation Requirements:  no  Recommended Level of Care:  telemetry  Code Status:  Full Code  Department:[facility] ED - [phone]  Other:  Syncope with ?seizures without hx        MDM:  61-year-old male history of Parkinson's here today with syncope and possible seizure. No recent illness. No chest pain or shortness of breath. No seizure history. He  was incontinent which is consistent with seizure. Neurologically intact for me on exam.  CT head without acute process. EKG and troponin okay. Vital signs stable. Patient to be admitted to hospital for further management and work-up of this. Clinical Impression:     ICD-10-CM ICD-9-CM    1. Syncope and collapse R55 780.2    2. Seizure ([address] R56.9 780.39    3.  Parkinson disease ([facility] [address] G20 332.0            Disposition: Admit  Da Gaming DO

## 2019-08-26 ENCOUNTER — APPOINTMENT (OUTPATIENT)
Dept: NON INVASIVE DIAGNOSTICS | Age: 74
DRG: 689 | End: 2019-08-26
Attending: INTERNAL MEDICINE
Payer: MEDICARE

## 2019-08-26 LAB
ANION GAP SERPL CALC-SCNC: 6 MMOL/L (ref 5–15)
ATRIAL RATE: 60 BPM
BASOPHILS # BLD: 0.1 K/UL (ref 0–0.1)
BASOPHILS NFR BLD: 1 % (ref 0–1)
BUN SERPL-MCNC: 24 MG/DL (ref 6–20)
BUN/CREAT SERPL: 21 (ref 12–20)
CALCIUM SERPL-MCNC: 8.5 MG/DL (ref 8.5–10.1)
CALCULATED R AXIS, ECG10: 34 DEGREES
CALCULATED T AXIS, ECG11: 64 DEGREES
CHLORIDE SERPL-SCNC: 109 MMOL/L (ref 97–108)
CO2 SERPL-SCNC: 28 MMOL/L (ref 21–32)
CORTIS AM PEAK SERPL-MCNC: 13.6 UG/DL (ref 4.3–22.45)
CREAT SERPL-MCNC: 1.14 MG/DL (ref 0.7–1.3)
DIAGNOSIS, 93000: NORMAL
DIFFERENTIAL METHOD BLD: ABNORMAL
ECHO AO ASC DIAM: 4.11 CM
ECHO AO ROOT DIAM: 4.17 CM
ECHO AV PEAK GRADIENT: 6.3 MMHG
ECHO AV PEAK VELOCITY: 125.2 CM/S
ECHO AV REGURGITANT PHT: 1312.4 CM
ECHO IVC SNIFF: 1.94 CM
ECHO LA AREA 4C: 15.1 CM2
ECHO LA MAJOR AXIS: 3.72 CM
ECHO LA TO AORTIC ROOT RATIO: 0.89
ECHO LA VOL 2C: 67.78 ML (ref 18–58)
ECHO LA VOL 4C: 37.43 ML (ref 18–58)
ECHO LA VOL BP: 52.6 ML (ref 18–58)
ECHO LA VOL/BSA BIPLANE: 26.41 ML/M2 (ref 16–28)
ECHO LA VOLUME INDEX A2C: 34.03 ML/M2 (ref 16–28)
ECHO LA VOLUME INDEX A4C: 18.79 ML/M2 (ref 16–28)
ECHO LV E' LATERAL VELOCITY: 5.17 CENTIMETER/SECOND
ECHO LV E' SEPTAL VELOCITY: 4.52 CENTIMETER/SECOND
ECHO LV INTERNAL DIMENSION DIASTOLIC: 4.18 CM (ref 4.2–5.9)
ECHO LV INTERNAL DIMENSION SYSTOLIC: 2.78 CM
ECHO LV IVSD: 1.38 CM (ref 0.6–1)
ECHO LV MASS 2D: 219.8 G (ref 88–224)
ECHO LV MASS INDEX 2D: 110.4 G/M2 (ref 49–115)
ECHO LV POSTERIOR WALL DIASTOLIC: 1.11 CM (ref 0.6–1)
ECHO LVOT DIAM: 2.51 CM
ECHO MV A VELOCITY: 82.44 CM/S
ECHO MV AREA PHT: 2.7 CM2
ECHO MV E DECELERATION TIME (DT): 279.4 MS
ECHO MV E VELOCITY: 40.19 CM/S
ECHO MV E/A RATIO: 0.49
ECHO MV PRESSURE HALF TIME (PHT): 81 MS
ECHO PV MAX VELOCITY: 110.27 CM/S
ECHO PV PEAK GRADIENT: 4.9 MMHG
ECHO RV INTERNAL DIMENSION: 3.41 CM
ECHO RV TAPSE: 1.5 CM (ref 1.5–2)
ECHO TV REGURGITANT MAX VELOCITY: 282.38 CM/S
ECHO TV REGURGITANT PEAK GRADIENT: 31.9 MMHG
EOSINOPHIL # BLD: 0.3 K/UL (ref 0–0.4)
EOSINOPHIL NFR BLD: 6 % (ref 0–7)
ERYTHROCYTE [DISTWIDTH] IN BLOOD BY AUTOMATED COUNT: 12.5 % (ref 11.5–14.5)
GLUCOSE SERPL-MCNC: 82 MG/DL (ref 65–100)
HCT VFR BLD AUTO: 31.4 % (ref 36.6–50.3)
HGB BLD-MCNC: 10.2 G/DL (ref 12.1–17)
IMM GRANULOCYTES # BLD AUTO: 0 K/UL (ref 0–0.04)
IMM GRANULOCYTES NFR BLD AUTO: 0 % (ref 0–0.5)
LYMPHOCYTES # BLD: 1 K/UL (ref 0.8–3.5)
LYMPHOCYTES NFR BLD: 20 % (ref 12–49)
MAGNESIUM SERPL-MCNC: 2.3 MG/DL (ref 1.6–2.4)
MCH RBC QN AUTO: 30.9 PG (ref 26–34)
MCHC RBC AUTO-ENTMCNC: 32.5 G/DL (ref 30–36.5)
MCV RBC AUTO: 95.2 FL (ref 80–99)
MONOCYTES # BLD: 0.9 K/UL (ref 0–1)
MONOCYTES NFR BLD: 18 % (ref 5–13)
NEUTS SEG # BLD: 2.6 K/UL (ref 1.8–8)
NEUTS SEG NFR BLD: 55 % (ref 32–75)
NRBC # BLD: 0 K/UL (ref 0–0.01)
NRBC BLD-RTO: 0 PER 100 WBC
PHOSPHATE SERPL-MCNC: 3.7 MG/DL (ref 2.6–4.7)
PISA AR MAX VEL: 548.2 CM/S
PLATELET # BLD AUTO: 278 K/UL (ref 150–400)
PMV BLD AUTO: 10.4 FL (ref 8.9–12.9)
POTASSIUM SERPL-SCNC: 4 MMOL/L (ref 3.5–5.1)
Q-T INTERVAL, ECG07: 430 MS
QRS DURATION, ECG06: 80 MS
QTC CALCULATION (BEZET), ECG08: 425 MS
RBC # BLD AUTO: 3.3 M/UL (ref 4.1–5.7)
SODIUM SERPL-SCNC: 143 MMOL/L (ref 136–145)
VENTRICULAR RATE, ECG03: 59 BPM
WBC # BLD AUTO: 4.8 K/UL (ref 4.1–11.1)

## 2019-08-26 PROCEDURE — 74011000258 HC RX REV CODE- 258: Performed by: INTERNAL MEDICINE

## 2019-08-26 PROCEDURE — 93306 TTE W/DOPPLER COMPLETE: CPT

## 2019-08-26 PROCEDURE — 65660000000 HC RM CCU STEPDOWN

## 2019-08-26 PROCEDURE — 97161 PT EVAL LOW COMPLEX 20 MIN: CPT

## 2019-08-26 PROCEDURE — 74011250636 HC RX REV CODE- 250/636: Performed by: INTERNAL MEDICINE

## 2019-08-26 PROCEDURE — 74011250637 HC RX REV CODE- 250/637: Performed by: INTERNAL MEDICINE

## 2019-08-26 PROCEDURE — 94760 N-INVAS EAR/PLS OXIMETRY 1: CPT

## 2019-08-26 PROCEDURE — 84100 ASSAY OF PHOSPHORUS: CPT

## 2019-08-26 PROCEDURE — 36415 COLL VENOUS BLD VENIPUNCTURE: CPT

## 2019-08-26 PROCEDURE — 77030010545

## 2019-08-26 PROCEDURE — 92610 EVALUATE SWALLOWING FUNCTION: CPT

## 2019-08-26 PROCEDURE — 80048 BASIC METABOLIC PNL TOTAL CA: CPT

## 2019-08-26 PROCEDURE — 83735 ASSAY OF MAGNESIUM: CPT

## 2019-08-26 PROCEDURE — 99218 HC RM OBSERVATION: CPT

## 2019-08-26 PROCEDURE — 97530 THERAPEUTIC ACTIVITIES: CPT

## 2019-08-26 PROCEDURE — 82533 TOTAL CORTISOL: CPT

## 2019-08-26 PROCEDURE — 95951 EEG 24 HR W/ VIDEO: CPT | Performed by: PSYCHIATRY & NEUROLOGY

## 2019-08-26 PROCEDURE — 85025 COMPLETE CBC W/AUTO DIFF WBC: CPT

## 2019-08-26 PROCEDURE — 97165 OT EVAL LOW COMPLEX 30 MIN: CPT

## 2019-08-26 RX ORDER — MAG HYDROX/ALUMINUM HYD/SIMETH 200-200-20
30 SUSPENSION, ORAL (FINAL DOSE FORM) ORAL
Status: DISCONTINUED | OUTPATIENT
Start: 2019-08-26 | End: 2019-08-27 | Stop reason: HOSPADM

## 2019-08-26 RX ORDER — MIDODRINE HYDROCHLORIDE 5 MG/1
2.5 TABLET ORAL 2 TIMES DAILY
Status: DISCONTINUED | OUTPATIENT
Start: 2019-08-26 | End: 2019-08-27 | Stop reason: HOSPADM

## 2019-08-26 RX ADMIN — CEFTRIAXONE SODIUM 1 G: 1 INJECTION, POWDER, FOR SOLUTION INTRAMUSCULAR; INTRAVENOUS at 09:11

## 2019-08-26 RX ADMIN — ALUMINUM HYDROXIDE, MAGNESIUM HYDROXIDE, AND SIMETHICONE 30 ML: 200; 200; 20 SUSPENSION ORAL at 22:28

## 2019-08-26 RX ADMIN — CARBIDOPA AND LEVODOPA 1.5 TABLET: 25; 100 TABLET ORAL at 16:27

## 2019-08-26 RX ADMIN — LEVOTHYROXINE SODIUM 25 MCG: 50 TABLET ORAL at 06:15

## 2019-08-26 RX ADMIN — ENOXAPARIN SODIUM 40 MG: 40 INJECTION SUBCUTANEOUS at 22:10

## 2019-08-26 RX ADMIN — MIDODRINE HYDROCHLORIDE 2.5 MG: 5 TABLET ORAL at 16:54

## 2019-08-26 RX ADMIN — Medication 10 ML: at 13:30

## 2019-08-26 RX ADMIN — Medication 10 ML: at 22:13

## 2019-08-26 RX ADMIN — Medication 10 ML: at 06:19

## 2019-08-26 RX ADMIN — FLUDROCORTISONE ACETATE 0.2 MG: 0.1 TABLET ORAL at 17:28

## 2019-08-26 RX ADMIN — TRIAMCINOLONE ACETONIDE: 1 CREAM TOPICAL at 22:11

## 2019-08-26 RX ADMIN — FERROUS SULFATE TAB 325 MG (65 MG ELEMENTAL FE) 325 MG: 325 (65 FE) TAB at 06:16

## 2019-08-26 RX ADMIN — CARBIDOPA AND LEVODOPA 1.5 TABLET: 25; 100 TABLET ORAL at 09:12

## 2019-08-26 RX ADMIN — FLUDROCORTISONE ACETATE 0.2 MG: 0.1 TABLET ORAL at 12:35

## 2019-08-26 RX ADMIN — MEMANTINE HYDROCHLORIDE 5 MG: 10 TABLET ORAL at 09:12

## 2019-08-26 RX ADMIN — LEVOTHYROXINE SODIUM 200 MCG: 100 TABLET ORAL at 06:15

## 2019-08-26 RX ADMIN — SODIUM CHLORIDE 50 ML/HR: 900 INJECTION, SOLUTION INTRAVENOUS at 09:13

## 2019-08-26 RX ADMIN — CALCIUM 500 MG: 500 TABLET ORAL at 09:12

## 2019-08-26 RX ADMIN — CARBIDOPA AND LEVODOPA 1.5 TABLET: 25; 100 TABLET ORAL at 22:10

## 2019-08-26 RX ADMIN — MIDODRINE HYDROCHLORIDE 2.5 MG: 5 TABLET ORAL at 09:12

## 2019-08-26 RX ADMIN — DULOXETINE HYDROCHLORIDE 60 MG: 30 CAPSULE, DELAYED RELEASE ORAL at 22:10

## 2019-08-26 NOTE — WOUND CARE
68 y.o.  male with PMH including hypothyroidism, thyroid cancer, skin cancer (basal and squamous cell with multiple surgical interventions), HTN, Anxiety/depression, parkinson's (brain stim). Patient currently resides in LTC at [facility] where a staff member witnessed patients arms extend outward, start shaking and become unresponsive. No recall of events once he regained consciousness. Patient was admitted for evaluation and management. Assessment:  Patient is resting in bed with compliant of right leg pain. Denies any falls, but noted to have scattered ecchymosis to the bilateral upper and lower extremities. Patient has multiple scarred/resolving surgical sites to the arms and face from skin cancer removals. Further skin assessment finds two areas to the right distal and proximal back. The distal wound measures approximately 4x4 cm in size with scarred and granulating friable wound bed (appearance of squamous cell base on hx). The proximal wound has a similar appearance without open areas measuring approximately 1.5x1.5cm. The inner groin/scrotum has small dry scabbed areas (per patient he has a herpes simplex) The shaft of the penis has small raised \"wart like\" lesions (patient is unsure of genital warts or HPV diagnosis). Recommendation:  Daily with skin care apply purple top lotion to all extremities and bony prominences for protection from friction/shear. Apply lotion to face as needed with daily skin care. Float heels and protect (with posey elbow/heel protectors). Turn Q2h while in bed (with turn team). Protect from lines and devices. When up in chair use a waffle cushion and reposition every 20 minutes. To the scrotum and to the back wound apply zguard daily with skin care and as needed (please change gloves so not to risk spreading herpes to the possible cancer sites).      Will Follow, Consult as needed,   Xiomy MONTIEL RN [facility], [facility].

## 2019-08-26 NOTE — PROGRESS NOTES
Problem: Mobility Impaired (Adult and Pediatric)  Goal: *Acute Goals and Plan of Care (Insert Text)  Description  FUNCTIONAL STATUS PRIOR TO ADMISSION: The patient was functional at the wheelchair level and required moderate assistance for transfers to the chair. HOME SUPPORT PRIOR TO ADMISSION: The patient lived in [address] [place] [address] at [facility]. Physical Therapy Goals  Initiated 8/26/2019  1. Patient will move from supine to sit and sit to supine , scoot up and down and roll side to side in bed with supervision/set-up within 7 day(s). 2.  Patient will transfer from bed to chair and chair to bed with moderate assistance  using the least restrictive device within 7 day(s). 3.  Patient will perform sit to stand with moderate assistance  within 7 day(s). Outcome: Progressing Towards Goal     PHYSICAL THERAPY EVALUATION  Patient: Andre Bangura (1 y.o. male)  Date: 8/26/2019  Primary Diagnosis: Syncope and collapse [R55]        Precautions:   DNR, Fall      ASSESSMENT  Based on the objective data described below, the patient presents with generalized weakness, impaired activity tolerance, + symptomatic orthostatic hypotension, and overall decreased independence from baseline following admission for syncope. Patient reports he primarily performs transfers to wheelchair with mod A at baseline. Overall min A for bed mobility, unable to assess sit to stand secondary to hypotension upon sitting EOB. BP sitting 106/54, sitting after 3 minutes symptomatic 63/55, return to supine 149/85. Will continue to follow to progress mobility. Current Level of Function Impacting Discharge (mobility/balance): limited by orthostatic hypotension, symptomatic    Functional Outcome Measure: The patient scored 25/100 on the Barthel outcome measure which is indicative of independence with ADL's and functional mobility.       Other factors to consider for discharge: LTC at baseline, wheelchair level     Patient will benefit from skilled therapy intervention to address the above noted impairments. PLAN :  Recommendations and Planned Interventions: bed mobility training, transfer training, gait training, therapeutic exercises, neuromuscular re-education, patient and family training/education and therapeutic activities      Frequency/Duration: Patient will be followed by physical therapy:  3 times a week to address goals. Recommendation for discharge: (in order for the patient to meet his/her long term goals)  Therapy up to 5 days/week in SNF setting or intensive home health therapy program at [facility]    This discharge recommendation:  Has not yet been discussed the attending provider and/or case management    Equipment recommendations for successful discharge (if) home: to be determined by rehab facility         SUBJECTIVE:   Patient stated My dizziness is getting worse.     OBJECTIVE DATA SUMMARY:   HISTORY:    Past Medical History:   Diagnosis Date    Arthritis     Cancer ([facility] [address] 2011    BCCA,SCCA CHEEK AND NOSE    Chronic pain     LEGS    Depression with anxiety     Elevated cholesterol     Essential tremor     ADRIAN ARMS- LEFT IS WORSE    GERD (gastroesophageal reflux disease)     h/o Thyroid cancer 2009    levothyroxine    Hypertension     Ill-defined condition     PARKINSONS DISEASE    Parkinson's disease ([facility] [address]     Psychiatric disorder     ANXIETY AND DEPRESSION     Past Surgical History:   Procedure Laterality Date    HX GI      COLONOSCOPY    HX HEENT  2009    mass removed from neck    HX ORTHOPAEDIC      RIGHT BUNIONECTOMY    HX OTHER SURGICAL  2009    thyroidectomy    HX OTHER SURGICAL      MANY BCCA REMOVAL WITH MAC    HX OTHER SURGICAL      FUNCTIONAL IMPLANT- LEFT CHEST    HX RETINAL DETACHMENT REPAIR  1997       Personal factors and/or comorbidities impacting plan of care:     Home Situation  Home Environment: ([facility])  Living Alone: No  Support Systems: ([facility] Staff)  Patient Expects to be Discharged to[de-identified] Unknown  Current DME Used/Available at Home: Wheelchair  Tub or Shower Type: Shower    EXAMINATION/PRESENTATION/DECISION MAKING:   Critical Behavior:  Neurologic State: Alert  Orientation Level: Oriented X4  Cognition: Follows commands     Hearing:     Skin:    Edema:   Range Of Motion:  AROM: Generally decreased, functional           PROM: Generally decreased, functional           Strength:    Strength: Generally decreased, functional                    Tone & Sensation:                                  Coordination:     Vision:      Functional Mobility:  Bed Mobility:     Supine to Sit: Minimum assistance;Assist x1;Additional time  Sit to Supine: Minimum assistance  Scooting: Supervision(scooting up in bed )  Transfers:  Sit to Stand: (unable 2* hypotension with supine to sit)                          Balance:   Sitting: Intact; With support  Standing: (unable to assess due to hypotension)  Ambulation/Gait Training:              Gait Description (WDL): (unable due to hypotension)                                          Stairs: Therapeutic Exercises:       Barthel Index:    Bathin  Bladder: 5  Bowels: 5  Groomin  Dressin  Feedin  Mobility: 0  Stairs: 0  Toilet Use: 0  Transfer (Bed to Chair and Back): 5  Total: 25/100       The Barthel ADL Index: Guidelines  1. The index should be used as a record of what a patient does, not as a record of what a patient could do. 2. The main aim is to establish degree of independence from any help, physical or verbal, however minor and for whatever reason. 3. The need for supervision renders the patient not independent. 4. A patient's performance should be established using the best available evidence. Asking the patient, friends/relatives and nurses are the usual sources, but direct observation and common sense are also important. However direct testing is not needed.   5. Usually the patient's performance over the preceding 24-48 hours is important, but occasionally longer periods will be relevant. 6. Middle categories imply that the patient supplies over 50 per cent of the effort. 7. Use of aids to be independent is allowed. Jannette Conde., Barthel, D.W. (3209). Functional evaluation: the Barthel Index. [address] [facility] (14)2. BIB Trevizo, Kaity Hall., Marlin Cevallos., [place], [address] (1999). Measuring the change indisability after inpatient rehabilitation; comparison of the responsiveness of the Barthel Index and Functional [place] Measure. Journal of Neurology, Neurosurgery, and Psychiatry, 66(4), 345-100. Cynthia Lopez, N.J.A, MAHI Gregory, & Tamia Mendoza MSNADY. (2004.) Assessment of post-stroke quality of life in cost-effectiveness studies: The usefulness of the Barthel Index and the EuroQoL-5D. Quality of Life Research, 13, 344-99         Activity Tolerance:   signs and symptoms of orthostatic hypotension  Please refer to the flowsheet for vital signs taken during this treatment. After treatment patient left in no apparent distress:   Call bell within reach, Bed / chair alarm activated and bed in chair position     COMMUNICATION/EDUCATION:   The patients plan of care was discussed with: Occupational Therapist and Registered Nurse. Fall prevention education was provided and the patient/caregiver indicated understanding., Patient/family have participated as able in goal setting and plan of care. and Patient/family agree to work toward stated goals and plan of care.     Thank you for this referral.  Katina Carey, PT   Time Calculation: 18 mins

## 2019-08-26 NOTE — CONSULTS
[facility]: [id number] [facility] [address] [facility] Neurology  [address] [place] [address]          Name:   Kiley Kahn record #: [id number]  Admission Date: 8/25/2019     Who Consulted: Dr. Alee Hutton    Reason for Consult: Syncope    HISTORY OF PRESENT ILLNESS:     This is a 68 y.o. male who is admitted for Syncope. The Neurology Service is asked to evaluate for possible seizure. PMHx of parkinson's disease with deep brain stimulator in situ    On 8/25, pt noted by staff at [facility] to have had a seizure. Staff member witnessed patients arms extend outward, start shaking and become unresponsive. Pt cannot recall the event. Pt not on AED as an OP. Does take sinemet  (1.5 tabs TID). Neuro-imaging:     CT Head:   FINDINGS:  There is a left-sided deep brain stimulator in situ. Ventricles and sulci are  normal in size and configuration. No evidence of hemorrhage or midline shift. No  early ischemic changes. Basal cisterns are clear. The left globe is notable for  retinal detachment repair. Paranasal sinuses are clear     IMPRESSION  IMPRESSION:   1. Left-sided deep brain stimulator in situ. No evidence of hemorrhage. 2.  Remote left globe retinal detachment repair. Care Plan discussed with:  Patient x   Family    RN    Care Manager    Consultant/Specialist:         Thank you for allowing the Neurology Service the pleasure of participating in the care of your patient. This patient will be discussed with my collaborating care team physician Dr. Dena Lennox and he may have further recommendations regarding this patient's care      Yamel Mayfield, DO    ====================  Attending Attestation:         I have reviewed the documentation provided by Dr. Aniyah Witt and , and we have discussed her findings and the clinical impression. I have formulated with her the proposed management plans for this patient.   Additionally,  I have personally evaluated the patient to verify the history and to confirm physical findings. Below are my additional comments:    36-year-old gentleman who lives at [facility] and is said to have had multiple events described as seizure in a general fashion. Not exactly certain what happened. The patient tells me he remembers being in the bathroom and then remembers trying to get up off of the floor. He is uncertain as to whether or not he remembers hitting the floor. He says he only fell once. He needed people to help him get up. He denies biting his tongue. Denies having this happen before. He does have Parkinson's disease and has deep brain stimulator implanted left hemisphere and this was done down at [facility] by Dr. Chancey Alpers. He tells me that Dr. Chancey Alpers told him that he would not be able to tolerate both sides being implanted. He had this done about 2 years ago. He takes Sinemet and he says he takes it 3 times a day and he believes it is 1 pill and they bring it to him so he is not sure what the timing interval is. He is also said to have some cognitive decline secondary to Parkinson's as well and is on memory modifying medications. He has orthostasis again likely secondary to combination of Parkinson's as well as the Parkinson's medications and is on Florinef and ProAmatine    Today he is awake alert oriented to [address] says that it is 2002 and he recalls the current president when I tell him the presence first name. He follows commands. He is dysarthric. He has need for some mouth care to be done. He has regular heart rate. Symmetric pulses. No edema. He is with full versions. No nystagmus. Symmetric face. He has rest tremor of the left upper extremity. Cogwheeling at the wrist bilaterally. No pronation or drift. He resists fully in the upper and lower external.  No ataxia.   Gait deferred    Impression/plan  68year-old gentleman with several episodes of what sounds to be a loss of consciousness or altered consciousness question whether this is seizure versus convulsive syncope versus other. His examination is unremarkable except for his parkinsonian features and dementing type features. Continue with his current meds for now. He needs a speech eval.  PT and OT to see. 24-hour EEG looking for epileptiform N normalities. Orthostatics need to be checked. If this fails to demonstrate any etiology then consider autonomic nervous system testing as an outpatient    We will follow-up    Garret Dow MD                       Impression/ Plan:      1. Rule out seizure/spell: In setting of UTI. TSH low (0.05). · Seizure precautions  · Check Folate, B12, FT4  · EEG      2. Mobility:   · Has been/not been OOB. · PT/OT to eval for rehab    4. Diet:    · Does not need SLP     5. VTE Prophylaxes:   · Per primary team       Review of Systems: 10 point ROS was performed. Pertinent positives listed in HPI. Negative ROS is as follows. Pt denies: angina, palpitations, paresthesias, weakness, vision loss, slurred speech, aphasia, confusion, fever, chills, falls, headache, diplopia, back pain, neck pain, prior episodes of vertigo, hallucinations, new medications or dosage changes. Physical Exam    General:   Alert, cooperative, no acute distress. Lungs:   Clear to auscultation bilaterally. No crackles/wheezes. Heart:  Abdominal:  Normal rhythm, no carotid bruits, no peripheral edema  Soft and nondistended   NEUROLOGICAL EXAM:     Mental Status: Oriented to time, place and person. Fully attentive. No aphasia. Full fund of knowledge. Normal recent and remote memory. Cranial Nerves:   Visual Fields:  normal in all quadrants in both eyes. EOM: no nystagmus. Facial movements:  symmetric, no ptosis Facial sensation:  intact to LT on both sides. Hearing:  normal.       Language:  no dysarthria, no aphasia, normal fluency, normal repetition. Tongue: midline.  Soft palate: not examined  SCMs: normal, symmetric. Reflexes:   LUExt: 2+/ 4                 RUExt: 2+/ 4  LLExt: 2+/4                  RLExt: 2+/ 4          Sensory:   LT and Temp intact in all extremities            Cerebellar:  No resting, no postural tremors, normal finger nose finger. No pronator drift                            Motor:           LUExt: 5/ 5               RUExt: 5/ 5                                              LLExt: 5/ 5                RLExt: 5/ 5        Gait:   Not tested              Allergies:   No Known Allergies    Outpatient Meds  No current facility-administered medications on file prior to encounter. Current Outpatient Medications on File Prior to Encounter   Medication Sig Dispense Refill    calcium carbonate (OS-FRAN) 500 mg calcium (1,250 mg) tablet Take 1 Tab by mouth daily.  mometasone (ELOCON) 0.1 % topical cream Apply  to affected area nightly. Apply to right ear topically at bedtime related to basal cell carcinoma of skin      HYDROcodone-acetaminophen (NORCO) 7.5-325 mg per tablet Take 2 Tabs by mouth every six (6) hours as needed for Pain.  levothyroxine (SYNTHROID) 200 mcg tablet Take 200 mcg by mouth Daily (before breakfast).  levothyroxine (SYNTHROID) 25 mcg tablet Take 25 mcg by mouth Daily (before breakfast).  magnesium oxide (MAG-OX) 400 mg tablet Take 400 mg by mouth two (2) times a day.  midodrine (PROAMITINE) 2.5 mg tablet Take 2.5 mg by mouth two (2) times a day.  memantine (NAMENDA) 5 mg tablet Take 5 mg by mouth daily.  carbidopa-levodopa (SINEMET)  mg per tablet Take 1.5 Tabs by mouth three (3) times daily.  docusate sodium (COLACE) 100 mg capsule Take 100 mg by mouth daily.  ferrous sulfate (IRON) 325 mg (65 mg iron) tablet Take 325 mg by mouth Daily (before breakfast).  potassium chloride (K-DUR, KLOR-CON) 20 mEq tablet Take 40 mEq by mouth daily.       potassium chloride (K-DUR, KLOR-CON) 20 mEq tablet Take 20 mEq by mouth nightly.  acetaminophen (TYLENOL) 325 mg tablet Take 650 mg by mouth every eight (8) hours as needed for Pain.  DULoxetine (CYMBALTA) 60 mg capsule Take 60 mg by mouth nightly.  fludrocortisone (FLORINEF) 0.1 mg tablet Take 0.2 mg by mouth two (2) times a day.          Inpatient Meds    Current Facility-Administered Medications   Medication Dose Route Frequency Provider Last Rate Last Dose    cefTRIAXone (ROCEPHIN) 1 g in 0.9% sodium chloride (MBP/ADV) 50 mL  1 g IntraVENous Q24H Yair Atwood  mL/hr at 08/26/19 0911 1 g at 08/26/19 0911    sodium chloride (NS) flush 5-40 mL  5-40 mL IntraVENous Q8H Caron Vo DO   10 mL at 08/26/19 0138    sodium chloride (NS) flush 5-40 mL  5-40 mL IntraVENous PRN Caron Vo DO        0.9% sodium chloride infusion  50 mL/hr IntraVENous CONTINUOUS Yair Atwood MD 75 mL/hr at 08/25/19 1841 75 mL/hr at 08/25/19 1841    enoxaparin (LOVENOX) injection 40 mg  40 mg SubCUTAneous Q24H Caron Vo DO   40 mg at 08/25/19 2106    LORazepam (ATIVAN) injection 2 mg  2 mg IntraVENous Q2H PRN Leslee Vo, DO        acetaminophen (TYLENOL) tablet 650 mg  650 mg Oral Q8H PRN Yrn Wallace V,         calcium carbonate (OS-FRAN) tablet 500 mg [elemental]  500 mg Oral DAILY Caron Vo,         carbidopa-levodopa (SINEMET)  mg per tablet 1.5 Tab  1.5 Tab Oral TID Caron Vo DO   1.5 Tab at 08/25/19 2106    DULoxetine (CYMBALTA) capsule 60 mg  60 mg Oral QHS Caron Vo, DO   60 mg at 08/25/19 2105    fludrocortisone (FLORINEF) tablet 0.2 mg  0.2 mg Oral BID Caron Vo DO        ferrous sulfate tablet 325 mg  325 mg Oral ACB Caron Vo V, DO   325 mg at 08/26/19 6213    levothyroxine (SYNTHROID) tablet 200 mcg  200 mcg Oral ACB Caron Vo V, DO   200 mcg at 08/26/19 0615    levothyroxine (SYNTHROID) tablet 25 mcg  25 mcg Oral ACB Caron Vo V, DO   25 mcg at 08/26/19 9144    memantine (NAMENDA) tablet 5 mg  5 mg Oral DAILY [place] Jose Collins V, DO        midodrine (PROAMITINE) tablet 2.5 mg  2.5 mg Oral BID [place] Keiko Collins Floral, DO        HYDROcodone-acetaminophen [facility]) 5-325 mg per tablet 1 Tab  1 Tab Oral Q4H PRN Barry Closs, DO        triamcinolone acetonide (KENALOG) 0.1 % cream   Topical QHS Barry Closs, DO               Past Medical History:   Diagnosis Date    Arthritis     Cancer ([facility] [address] 2011    BCCA,SCCA CHEEK AND NOSE    Chronic pain     LEGS    Depression with anxiety     Elevated cholesterol     Essential tremor     ADRIAN ARMS- LEFT IS WORSE    GERD (gastroesophageal reflux disease)     h/o Thyroid cancer 2009    levothyroxine    Hypertension     Ill-defined condition     PARKINSONS DISEASE    Parkinson's disease ([facility] [address]     Psychiatric disorder     ANXIETY AND DEPRESSION       Past Surgical History:   Procedure Laterality Date    HX GI      COLONOSCOPY    HX HEENT  2009    mass removed from neck    HX ORTHOPAEDIC      RIGHT BUNIONECTOMY    HX OTHER SURGICAL  2009    thyroidectomy    HX OTHER SURGICAL      MANY BCCA REMOVAL WITH MAC    HX OTHER SURGICAL      FUNCTIONAL IMPLANT- LEFT CHEST    HX RETINAL DETACHMENT REPAIR  1997       family history includes Alcohol abuse in his father; Cancer in his mother; Heart Disease in his mother; Hypertension in his father; No Known Problems in his brother, daughter, and son; Stroke in his father. reports that he has never smoked. He has never used smokeless tobacco. He reports that he drinks alcohol. He reports that he does not use drugs. Lab Results (last 24 hrs)  Recent Results (from the past 24 hour(s))   SAMPLES BEING HELD    Collection Time: 08/25/19  1:00 PM   Result Value Ref Range    SAMPLES BEING HELD 1BL,1RED,1SST     COMMENT        Add-on orders for these samples will be processed based on acceptable specimen integrity and analyte stability, which may vary by analyte.    CBC WITH AUTOMATED DIFF    Collection Time: 08/25/19  1:00 PM   Result Value Ref Range    WBC 5.2 4.1 - 11.1 K/uL    RBC 3.77 (L) 4.10 - 5.70 M/uL    HGB 11.9 (L) 12.1 - 17.0 g/dL    HCT 36.6 36.6 - 50.3 %    MCV 97.1 80.0 - 99.0 FL    MCH 31.6 26.0 - 34.0 PG    MCHC 32.5 30.0 - 36.5 g/dL    RDW 12.7 11.5 - 14.5 %    PLATELET 283 114 - 670 K/uL    MPV 10.4 8.9 - 12.9 FL    NRBC 0.0 0  WBC    ABSOLUTE NRBC 0.00 0.00 - 0.01 K/uL    NEUTROPHILS 61 32 - 75 %    LYMPHOCYTES 17 12 - 49 %    MONOCYTES 17 (H) 5 - 13 %    EOSINOPHILS 4 0 - 7 %    BASOPHILS 1 0 - 1 %    IMMATURE GRANULOCYTES 0 0.0 - 0.5 %    ABS. NEUTROPHILS 3.2 1.8 - 8.0 K/UL    ABS. LYMPHOCYTES 0.9 0.8 - 3.5 K/UL    ABS. MONOCYTES 0.9 0.0 - 1.0 K/UL    ABS. EOSINOPHILS 0.2 0.0 - 0.4 K/UL    ABS. BASOPHILS 0.1 0.0 - 0.1 K/UL    ABS. IMM. GRANS. 0.0 0.00 - 0.04 K/UL    DF AUTOMATED     METABOLIC PANEL, COMPREHENSIVE    Collection Time: 08/25/19  1:00 PM   Result Value Ref Range    Sodium 141 136 - 145 mmol/L    Potassium 4.4 3.5 - 5.1 mmol/L    Chloride 106 97 - 108 mmol/L    CO2 31 21 - 32 mmol/L    Anion gap 4 (L) 5 - 15 mmol/L    Glucose 94 65 - 100 mg/dL    BUN 22 (H) 6 - 20 MG/DL    Creatinine 1.32 (H) 0.70 - 1.30 MG/DL    BUN/Creatinine ratio 17 12 - 20      GFR est AA >60 >60 ml/min/1.73m2    GFR est non-AA 53 (L) >60 ml/min/1.73m2    Calcium 8.9 8.5 - 10.1 MG/DL    Bilirubin, total 0.5 0.2 - 1.0 MG/DL    ALT (SGPT) 27 12 - 78 U/L    AST (SGOT) 26 15 - 37 U/L    Alk.  phosphatase 155 (H) 45 - 117 U/L    Protein, total 6.7 6.4 - 8.2 g/dL    Albumin 3.2 (L) 3.5 - 5.0 g/dL    Globulin 3.5 2.0 - 4.0 g/dL    A-G Ratio 0.9 (L) 1.1 - 2.2     TROPONIN I    Collection Time: 08/25/19  1:00 PM   Result Value Ref Range    Troponin-I, Qt. <0.05 <0.05 ng/mL   URINALYSIS W/ RFLX MICROSCOPIC    Collection Time: 08/25/19  5:54 PM   Result Value Ref Range    Color YELLOW/STRAW      Appearance TURBID (A) CLEAR      Specific gravity 1.017 1.003 - 1.030      pH (UA) 8.0 5.0 - 8.0      Protein 100 (A) NEG mg/dL    Glucose NEGATIVE  NEG mg/dL    Ketone NEGATIVE  NEG mg/dL    Bilirubin NEGATIVE  NEG      Blood NEGATIVE  NEG      Urobilinogen 0.2 0.2 - 1.0 EU/dL    Nitrites NEGATIVE  NEG      Leukocyte Esterase LARGE (A) NEG      WBC >100 (H) 0 - 4 /hpf    RBC 5-10 0 - 5 /hpf    Epithelial cells FEW FEW /lpf    Bacteria 3+ (A) NEG /hpf   DRUG SCREEN, URINE    Collection Time: 08/25/19  5:54 PM   Result Value Ref Range    AMPHETAMINES NEGATIVE  NEG      BARBITURATES NEGATIVE  NEG      BENZODIAZEPINES NEGATIVE  NEG      COCAINE NEGATIVE  NEG      METHADONE NEGATIVE  NEG      OPIATES NEGATIVE  NEG      PCP(PHENCYCLIDINE) NEGATIVE  NEG      THC (TH-CANNABINOL) NEGATIVE  NEG      Drug screen comment (NOTE)    URINE CULTURE HOLD SAMPLE    Collection Time: 08/25/19  5:54 PM   Result Value Ref Range    Urine culture hold        URINE ON HOLD IN MICROBIOLOGY DEPT FOR 3 DAYS. IF UNPRESERVED URINE IS SUBMITTED, IT CANNOT BE USED FOR ADDITIONAL TESTING AFTER 24 HRS, RECOLLECTION WILL BE REQUIRED.    TSH 3RD GENERATION    Collection Time: 08/25/19  6:03 PM   Result Value Ref Range    TSH 0.05 (L) 0.36 - 0.61 uIU/mL   METABOLIC PANEL, BASIC    Collection Time: 08/26/19 12:32 AM   Result Value Ref Range    Sodium 143 136 - 145 mmol/L    Potassium 4.0 3.5 - 5.1 mmol/L    Chloride 109 (H) 97 - 108 mmol/L    CO2 28 21 - 32 mmol/L    Anion gap 6 5 - 15 mmol/L    Glucose 82 65 - 100 mg/dL    BUN 24 (H) 6 - 20 MG/DL    Creatinine 1.14 0.70 - 1.30 MG/DL    BUN/Creatinine ratio 21 (H) 12 - 20      GFR est AA >60 >60 ml/min/1.73m2    GFR est non-AA >60 >60 ml/min/1.73m2    Calcium 8.5 8.5 - 10.1 MG/DL   CBC WITH AUTOMATED DIFF    Collection Time: 08/26/19 12:32 AM   Result Value Ref Range    WBC 4.8 4.1 - 11.1 K/uL    RBC 3.30 (L) 4.10 - 5.70 M/uL    HGB 10.2 (L) 12.1 - 17.0 g/dL    HCT 31.4 (L) 36.6 - 50.3 %    MCV 95.2 80.0 - 99.0 FL    MCH 30.9 26.0 - 34.0 PG    MCHC 32.5 30.0 - 36.5 g/dL    RDW 12.5 11.5 - 14.5 %    PLATELET 663 159 - 009 K/uL    MPV 10.4 8.9 - 12.9 FL    NRBC 0.0 0  WBC    ABSOLUTE NRBC 0.00 0.00 - 0.01 K/uL    NEUTROPHILS 55 32 - 75 %    LYMPHOCYTES 20 12 - 49 %    MONOCYTES 18 (H) 5 - 13 %    EOSINOPHILS 6 0 - 7 %    BASOPHILS 1 0 - 1 %    IMMATURE GRANULOCYTES 0 0.0 - 0.5 %    ABS. NEUTROPHILS 2.6 1.8 - 8.0 K/UL    ABS. LYMPHOCYTES 1.0 0.8 - 3.5 K/UL    ABS. MONOCYTES 0.9 0.0 - 1.0 K/UL    ABS. EOSINOPHILS 0.3 0.0 - 0.4 K/UL    ABS. BASOPHILS 0.1 0.0 - 0.1 K/UL    ABS. IMM.  GRANS. 0.0 0.00 - 0.04 K/UL    DF AUTOMATED     MAGNESIUM    Collection Time: 08/26/19 12:32 AM   Result Value Ref Range    Magnesium 2.3 1.6 - 2.4 mg/dL   PHOSPHORUS    Collection Time: 08/26/19 12:32 AM   Result Value Ref Range    Phosphorus 3.7 2.6 - 4.7 MG/DL

## 2019-08-26 NOTE — PROGRESS NOTES
8- Reason for Admission:   Seizure                  RRAT Score:  18                Do you (patient/family) have any concerns for transition/discharge? No               Plan for utilizing home health:   No    Current Advanced Directive/Advance Care Plan:  DNR            Transition of Care Plan:  Return to [facility] met with the patient who informed me he has been living at [facility] for over a year and wants to return upon discharge. He signed the first Medicare letter, was given a copy and the original was  Placed on his chart. I spoke with the patient's brother, Aimee Oneill ([phone]), who is also in agreemant with the patient returning to [facility]-choice letter on chart. I sent the referral to [facility] thru  [employer] and they are willing to accept him back. CM will follow and arrange the transfer when stable for discharge. Care Management Interventions  PCP Verified by CM:  Yes  Mode of Transport at Discharge: [address]  Discharge Durable Medical Equipment: No  Physical Therapy Consult: Yes  Occupational Therapy Consult: Yes  Speech Therapy Consult: Yes  Current Support Network: Yael discussed with Pt/Family/Caregiver: Yes  Freedom of Choice Offered: Yes  Discharge Location  Discharge Placement: (Return to [facility])    YOSHI Blackburn, CM

## 2019-08-26 NOTE — PROGRESS NOTES
Problem: Dysphagia (Adult)  Goal: *Acute Goals and Plan of Care (Insert Text)  Description  Swallowing goals initaited 8-26-19:  1) tolerate regualr diet, thins without s/s aspiration by 8-29-19   Outcome: Progressing Towards Goal   SPEECH LANGUAGE PATHOLOGY BEDSIDE SWALLOW EVALUATION  Patient: Sandy Wilson (26 y.o. male)  Date: 8/26/2019  Primary Diagnosis: Syncope and collapse [R55]  Syncope and collapse [R55]        Precautions: aspiration  DNR, Fall    ASSESSMENT :  Based on the objective data described below, the patient presents with mild oral pharyngeal dysphagia and mild-moderate dysarthria. History of parkinson's disease. Suspect dysphagia is at baseline. Admitted with possible Sz, sycope? PMH: lives in [address] [place] [address] at [facility]. Parkinson's with deep brain stimulator, CA on R ear-currently in rad tx. Other skin CA on head. Thyroid CA, hypothyroid, arthritis, XOL. GERD,HTN, anxiety and depression. .    Patient will benefit from skilled intervention to address the above impairments. Patients rehabilitation potential is considered to be Good  Factors which may influence rehabilitation potential include:   ? None noted  ? Mental ability/status  ? Medical condition  ? Home/family situation and support systems  ? Safety awareness  ? Pain tolerance/management  ? Other:      PLAN :  Recommendations and Planned Interventions:  Ok for regular diet,thins . Frequency/Duration: Patient will be followed by speech-language pathology 1 time a week to address goals. Discharge Recommendations: return to prior setting      SUBJECTIVE:   Patient stated this is [employer].     OBJECTIVE:     Past Medical History:   Diagnosis Date    Arthritis     Cancer ([facility] [address] 2011    BCCA,SCCA CHEEK AND NOSE    Chronic pain     LEGS    Depression with anxiety     Elevated cholesterol     Essential tremor     ADRIAN ARMS- LEFT IS WORSE    GERD (gastroesophageal reflux disease)     h/o Thyroid cancer 2009    levothyroxine    Hypertension     Ill-defined condition     PARKINSONS DISEASE    Parkinson's disease [facility])     Psychiatric disorder     ANXIETY AND DEPRESSION     Past Surgical History:   Procedure Laterality Date    HX GI      COLONOSCOPY    HX HEENT  2009    mass removed from neck    HX ORTHOPAEDIC      RIGHT BUNIONECTOMY    HX OTHER SURGICAL  2009    thyroidectomy    HX OTHER SURGICAL      MANY BCCA REMOVAL WITH MAC    HX OTHER SURGICAL      FUNCTIONAL IMPLANT- LEFT CHEST    HX RETINAL DETACHMENT REPAIR  1997     Prior Level of Function/Home Situation:   Home Situation  Home Environment: ([facility])  Living Alone: No  Support Systems: ([facility] Staff)  Patient Expects to be Discharged to[de-identified] Unknown  Current DME Used/Available at Home: Wheelchair  Tub or Shower Type: Shower  Diet prior to admission: regular, thins  Current Diet:  regular,l thins   Cognitive and Communication Status:  Neurologic State: Alert  Orientation Level: Oriented to person, Disoriented to place, Disoriented to situation, Disoriented to time  Cognition: Follows commands, Memory loss  Perception: Appears intact  Perseveration: No perseveration noted  Safety/Judgement: Decreased insight into deficits  Oral Assessment:  Oral Assessment  Labial: (mildly masked facies)  Dentition: (poor condition of teeth-many broken and worn. missing 30%)  Oral Hygiene: mildly dry with mild oral residue  Lingual: No impairment  Mandible: No impairment  P.O. Trials:  Patient Position: upright in bed  Vocal quality prior to P.O.: (mild-moderate dysarthria)  Consistency Presented: Solid; Thin liquid;Mixed consistency  How Presented: Self-fed/presented;Spoon;Straw;Successive swallows   ORAL PHASE:   Patient had just finished feeding himself 90% of meal. Feeding issues with spillage. Noted mild oral residue on teeth. Mildly reduced chew ROM  PHARYNGEAL PHASE:   Mild weakness. Occasional wet voice.  Complicated by his mild-moderate dysarthria. Patient denies h/o dysphagia. RN had no concerns. CXR: negative. SPEECH:   Noted mild-moderate dysarthria with intermittent consonant spirantization with all consonants. Reduced volume. Patient admits to fluctuations in his speech, but no  known reason. He denies that it could be related to PD med dosing. NOMS:   The NOMS functional outcome measure was used to quantify this patient's level of swallowing impairment. Based on the NOMS, the patient was determined to be at level 5 for swallow function       NOMS Swallowing Levels:  Level 1 (CN): NPO  Level 2 (CM): NPO but takes consistency in therapy  Level 3 (CL): Takes less than 50% of nutrition p.o. and continues with nonoral feedings; and/or safe with mod cues; and/or max diet restriction  Level 4 (CK): Safe swallow but needs mod cues; and/or mod diet restriction; and/or still requires some nonoral feeding/supplements  Level 5 (CJ): Safe swallow with min diet restriction; and/or needs min cues  Level 6 (CI): Independent with p.o.; rare cues; usually self cues; may need to avoid some foods or needs extra time  Level 7 ([address] [place] [address]): Independent for all p.o.  JOVANNA. (2003). National Outcomes Measurement System (NOMS): Adult Speech-Language Pathology User's Guide. Pain:  Pain Scale 1: Numeric (0 - 10)  Pain Intensity 1: 0     After treatment:   ?            Patient left in no apparent distress sitting up in chair  ? Patient left in no apparent distress in bed  ? Call bell left within reach  ? Nursing notified  ? Caregiver present  ? Bed alarm activated    COMMUNICATION/EDUCATION:   The patients plan of care including recommendations, planned interventions, and recommended diet changes were discussed with: Registered Nurse. Patient was educated regarding His deficit(s) of dysphagia  as this relates to His diagnosis of AMS.   He demonstrated Fair understanding as evidenced by poor memory. l.  ?            Posted safety precautions in patient's room. ? Patient/family have participated as able in goal setting and plan of care. ?            Patient/family agree to work toward stated goals and plan of care. ?            Patient understands intent and goals of therapy, but is neutral about his/her participation. ? Patient is unable to participate in goal setting and plan of care.     Thank you for this referral.  WILMER Matthews  Time Calculation: 15 mins

## 2019-08-26 NOTE — PROGRESS NOTES
Problem: Self Care Deficits Care Plan (Adult)  Goal: *Acute Goals and Plan of Care (Insert Text)  Description    FUNCTIONAL STATUS PRIOR TO ADMISSION: The patient was functional at the wheelchair level and was supervision for transfers to the chair. HOME SUPPORT: The patient lived at [facility] and has assistance from staff PRN. Occupational Therapy Goals  Initiated 8/26/2019  1. Patient will perform grooming with modified independence within 7 day(s). 2.  Patient will perform upper body dressing and bathing with supervision/set-up within 7 day(s). 3.  Patient will perform lower body dressing and bathing with moderate assistance within 7 day(s). 4.  Patient will perform toilet transfers with minimal assistance within 7 day(s). 5.  Patient will perform all aspects of toileting with minimal assistance within 7 day(s). 6.  Patient will participate in upper extremity therapeutic exercise/activities with supervision/set-up for 10 minutes within 7 day(s). 7.  Patient will utilize energy conservation techniques during functional activities with verbal cues within 7 day(s). Outcome: Progressing Towards Goal   OCCUPATIONAL THERAPY EVALUATION  Patient: Elif Rondon (41 y.o. male)  Date: 8/26/2019  Primary Diagnosis: Syncope and collapse [R55]  Syncope and collapse [R55]        Precautions:  DNR, Fall    ASSESSMENT  Based on the objective data described below, the patient presents with decreased activity tolerance, generalized weakness, impaired balance, B UE tremors (L>R), and orthostatic hypotension limiting independence following admission for syncope. Patient was admitted from [facility] and anticipates return there at discharge; Unsure if it is assisted living or long term care but he confirms he has assistance PRN. Patient with significant drop in BP when transitioning to sitting (63/55) with associated symptoms of dizziness and lightheadedness; See below and doc flowsheets for details.   Patient unable to progress to OOB/ADL transfers or engage in standing ADLs d/t low BP. Bed was placed in semi chair position to encourage upright activity and engage in seated ADLs. Patient would benefit from continued skilled OT to progress towards goals and improve overall independence. Current Level of Function Impacting Discharge (ADLs/self-care): He requires min A for bed mobility, unable to progress to OOB transfers and required max to total A for LB ADLs. Functional Outcome Measure: The patient scored 20/100 on the Barthel Index outcome measure. Other factors to consider for discharge: Parkinsons at baseline with UE tremors      Patient will benefit from skilled therapy intervention to address the above noted impairments. PLAN :  Recommendations and Planned Interventions: self care training, functional mobility training, therapeutic exercise, balance training, therapeutic activities, endurance activities, patient education, home safety training and family training/education    Frequency/Duration: Patient will be followed by occupational therapy 3 times a week to address goals. Recommendation for discharge: (in order for the patient to meet his/her long term goals)  Therapy up to 5 days/week in SNF setting; Return to [facility] with OT services     This discharge recommendation:  A follow-up discussion with the attending provider and/or case management is planned    Equipment recommendations for successful discharge (if) home: none       SUBJECTIVE:   Patient stated I am very dizzy.     OBJECTIVE DATA SUMMARY:   HISTORY:   Past Medical History:   Diagnosis Date    Arthritis     Cancer ([facility] [address] 2011    BCCA,SCCA CHEEK AND NOSE    Chronic pain     LEGS    Depression with anxiety     Elevated cholesterol     Essential tremor     ADRIAN ARMS- LEFT IS WORSE    GERD (gastroesophageal reflux disease)     h/o Thyroid cancer 2009    levothyroxine    Hypertension     Ill-defined condition     PARKINSONS DISEASE    Parkinson's disease [facility])     Psychiatric disorder     ANXIETY AND DEPRESSION     Past Surgical History:   Procedure Laterality Date    HX GI      COLONOSCOPY    HX HEENT  2009    mass removed from neck    HX ORTHOPAEDIC      RIGHT BUNIONECTOMY    HX OTHER SURGICAL  2009    thyroidectomy    HX OTHER SURGICAL      MANY BCCA REMOVAL WITH MAC    HX OTHER SURGICAL      FUNCTIONAL IMPLANT- LEFT CHEST    HX RETINAL DETACHMENT REPAIR  1997       Expanded or extensive additional review of patient history:     Home Situation  Home Environment: ([facility])  Living Alone: No  Support Systems: ([facility] Staff)  Patient Expects to be Discharged to[de-identified] Unknown  Current DME Used/Available at Home: Wheelchair  Tub or Shower Type: Shower    Hand dominance: Right    EXAMINATION OF PERFORMANCE DEFICITS:  Cognitive/Behavioral Status:  Neurologic State: Alert  Orientation Level: Oriented to person;Disoriented to place; Disoriented to situation;Disoriented to time  Cognition: Follows commands;Memory loss  Perception: Appears intact  Perseveration: No perseveration noted  Safety/Judgement: Decreased insight into deficits    Vitals:    08/26/19 1125 08/26/19 1128 08/26/19 1130 08/26/19 1154   BP: (!) 63/55 149/85 (!) 146/91 100/45   BP 1 Location: Right arm Right arm Right arm Right arm   BP Patient Position: Sitting Supine Comment: supported sitting/bed in chair sitting position At rest   Pulse: 71 72 71 69   Resp:   12    Temp:   98 °F (36.7 °C)    SpO2:   98%    Weight:       Height:            Skin: Intact in the uppers    Edema: None noted in the uppers    Vision/Perceptual:    Tracking: Able to track stimulus in all quadrants w/o difficulty    Diplopia: No    Acuity: Within Defined Limits       Range of Motion:  AROM: Generally decreased, functional  PROM: Generally decreased, functional    Strength:  Strength: Generally decreased, functional    Coordination:  Fine Motor Skills-Upper: Left Intact; Right Intact    Gross Motor Skills-Upper: Left Intact; Right Intact    Tone & Sensation:  Tone: normal  Sensation: intact    Balance:  Sitting: Intact  Standing: (unable to assess due to hypotension)    Functional Mobility and Transfers for ADLs:  Bed Mobility:  Supine to Sit: Minimum assistance;Assist x1;Additional time  Sit to Supine: Minimum assistance  Scooting: Supervision(scooting up in bed )    Transfers:  Sit to Stand: (unable 2* hypotension with supine to sit)    ADL Assessment:  Feeding: Supervision;Setup    Oral Facial Hygiene/Grooming: Minimum assistance    Bathing: Maximum assistance    Upper Body Dressing: Moderate assistance    Lower Body Dressing: Total assistance    Toileting: Total assistance     Cognitive Retraining  Safety/Judgement: Decreased insight into deficits    Functional Measure:  Barthel Index:    Bathin  Bladder: 5  Bowels: 5  Groomin  Dressin  Feedin  Mobility: 0  Stairs: 0  Toilet Use: 0  Transfer (Bed to Chair and Back): 5  Total: 20/100        Percentage of impairment   0%   1-19%   20-39%   40-59%   60-79%   80-99%   100%   Barthel Score 0-100 100 99-80 79-60 59-40 20-39 1-19   0     The Barthel ADL Index: Guidelines  1. The index should be used as a record of what a patient does, not as a record of what a patient could do. 2. The main aim is to establish degree of independence from any help, physical or verbal, however minor and for whatever reason. 3. The need for supervision renders the patient not independent. 4. A patient's performance should be established using the best available evidence. Asking the patient, friends/relatives and nurses are the usual sources, but direct observation and common sense are also important. However direct testing is not needed. 5. Usually the patient's performance over the preceding 24-48 hours is important, but occasionally longer periods will be relevant. 6. Middle categories imply that the patient supplies over 50 per cent of the effort.   7. Use of aids to be independent is allowed. Disha Chen, Barthel, D.W. (9118). Functional evaluation: the Barthel Index. [address] (14)2. BIB Noriega, Rosa Elena Foote.Patti., Virgen, [address] (1999). Measuring the change indisability after inpatient rehabilitation; comparison of the responsiveness of the Barthel Index and Functional Oxford Measure. Journal of Neurology, Neurosurgery, and Psychiatry, 66(4), 655-884. ANNAMARIA Grewal, MAHI Gregory, & Kindra Milan M.A. (2004.) Assessment of post-stroke quality of life in cost-effectiveness studies: The usefulness of the Barthel Index and the EuroQoL-5D. Quality of Life Research, 15, 113-59        Occupational Therapy Evaluation Charge Determination   History Examination Decision-Making   LOW Complexity : Brief history review  LOW Complexity : 1-3 performance deficits relating to physical, cognitive , or psychosocial skils that result in activity limitations and / or participation restrictions  LOW Complexity : No comorbidities that affect functional and no verbal or physical assistance needed to complete eval tasks       Based on the above components, the patient evaluation is determined to be of the following complexity level: LOW     Activity Tolerance:   Fair  Please refer to the flowsheet for vital signs taken during this treatment. After treatment patient left in no apparent distress:    Supine in bed, Call bell within reach and Bed / chair alarm activated    COMMUNICATION/EDUCATION:   The patients plan of care was discussed with: Physical Therapist, Registered Nurse and patient . Home safety education was provided and the patient/caregiver indicated understanding., Patient/family have participated as able in goal setting and plan of care. and Patient/family agree to work toward stated goals and plan of care. This patients plan of care is appropriate for delegation to [facility].     Thank you for this referral.  Wing Menendez Jeremias Bruner OTR/L  Time Calculation: 20 mins

## 2019-08-26 NOTE — PROGRESS NOTES
0730-  Bedside and Verbal shift change report given to Eduardo Jain RN (oncoming nurse) by Patrcia Goodpasture RN (offgoing nurse). Report included the following information SBAR, Kardex and Recent Results. .    1900-Bedside and Verbal shift change report given to Marlon Krishnamurthy RN (oncoming nurse) by Vinita Barraza (offgoing nurse). Report included the following information SBAR, Kardex and Recent Results.

## 2019-08-26 NOTE — PROGRESS NOTES
Nutrition Assessment:    RECOMMENDATIONS/INTERVENTION(S):   1. Continue with Cardiac Diet order. 2. Will order chocolate Magic Cup daily for additional kcals. 3. Monitor PO intakes of meals/ONS, weight, labs, GI.      ASSESSMENT:   8/26: 69 yo male admitted for syncope with possible seizure. RD assessment for low BMI. PMHx: skin CA, thyroid CA, HTN, hypercholesterolemia, GERD, parkinson's. Underweight per BMI per advanced age. Noted in wt hx in EMR that pt's weight fluctuated between 173-190lbs within last year. Current weight charted as 162lbs, indicating anywhere from 11-28lb weight loss over last year (6-14% loss - not severe for time frame). Pt unable to quantify weight loss/time frames. Very difficult to understand pt. S/P SLP eval - recommended regular consistency food with thin liquids. Cardiac diet ordered. Pt states he did not get breakfast this morning but ate all of his lunch. Confirmed this with RN, states breakfast tray was never delivered but pt ate 100% lunch and was still hungry. Pt able to feed himself, just needs to bet sat up, eats very slowly so meals take a while. Pt states he does not like Ensure but does like ice-cream.  Labs reviewed. Meds: Calcium carbonate, FeSu. NaCl running at 50ml/hr. No open wounds noted. Diet Order: Cardiac  % Eaten:  No data found. Pertinent Medications: [x] Reviewed    Labs: [x] Reviewed    Anthropometrics: Height: 6' 2\" (188 cm) Weight: 73.9 kg (162 lb 14.7 oz)    IBW (%IBW):   ( ) UBW (%UBW):   (  %)      BMI: Body mass index is 20.92 kg/m². This BMI is indicative of:   [x] Underweight  - per age  [] Normal    [] Overweight    []  Obesity    []  Extreme Obesity (BMI>40)  Estimated Nutrition Needs (Based on): 2276 Kcals/day(REE 1559 x AF 1.3 + 250) , 81 g(81-96gm (1.1-1.3gm/kg/d)) Protein  Carbohydrate:  At Least 130 g/day  Fluids: 2276 mL/day (1 ml/kcal)    Last BM: unknown  [x]Active     []Hyperactive  []Hypoactive       [] Absent BS  Skin:    [x] Intact   [] Incision  [] Breakdown   [] DTI   [] Tears/Excoriation/Abrasion  []Edema [] Other:    Wt Readings from Last 30 Encounters:   08/26/19 73.9 kg (162 lb 14.7 oz)   05/20/19 86.2 kg (190 lb)   05/09/19 78.5 kg (173 lb)   08/20/18 84.8 kg (187 lb)   07/26/18 85 kg (187 lb 8 oz)   07/20/18 85 kg (187 lb 8 oz)   04/02/15 102.5 kg (226 lb)   01/24/13 108 kg (238 lb)   01/11/13 108 kg (238 lb)   09/02/11 107 kg (236 lb)   09/01/11 107 kg (236 lb)   08/02/11 106.1 kg (234 lb)      NUTRITION DIAGNOSES:   Problem:  Underweight     Etiology: related to inability to consume sufficient energy to maintain appropriate weight     Signs/Symptoms: as evidenced by BMI 20 (age > 65 years)      NUTRITION INTERVENTIONS:  Meals/Snacks: General/healthful diet   Supplements: Commercial supplement              GOAL:   Consume > 75% all meals + ONS to promote weight gain of 0.5lbs within next 2-4 days    Cultural, Roman Catholic, or Ethnic Dietary Needs: None     EDUCATION & DISCHARGE NEEDS:    [x] None Identified   [] Identified and Education Provided/Documented   [] Identified and Pt declined/was not appropriate      [x] Interdisciplinary Care Plan Reviewed/Documented    [x] Discharge Needs:    Cardiac Diet with ONS daily   [] No Nutrition Related Discharge Needs    NUTRITION RISK:   Pt Is At Nutrition Risk  [x]     No Nutrition Risk Identified  []       PT SEEN FOR:    []  MD Consult: []Calorie Count      []Diabetic Diet Education        []Diet Education     []Electrolyte Management     []General Nutrition Management and Supplements     []Management of Tube Feeding     []TPN Recommendations    []  RN Referral:  []MST score >=2     []Enteral/Parenteral Nutrition PTA     []Pregnant: Gestational DM or Multigestation                 [] Pressure Ulcer    [x]  Low BMI      []  Length of Stay       [] Dysphagia Diet         [] Ventilator  []  Follow-up     Previous Recommendations:   [] Implemented          [] Not Implemented          [x] Not Applicable    Previous Goal:   [] Met              [] Progressing Towards Goal              [] Not Progressing Towards Goal   [x] Not Applicable            Rosy Montaño, [address] [place] [address]  Pager [phone]  Phone [phone]

## 2019-08-26 NOTE — PROGRESS NOTES
Orders received, chart reviewed and patient evaluated by physical therapy. Pending progression with skilled acute physical therapy, recommend:  Therapy up to 5 days/week in SNF setting or intensive home health therapy program at [facility] with nursing patient to complete as able in order to maintain strength, endurance and independence: bed in chair position 3 x daily. Thank you for your assistance. Full evaluation to follow.

## 2019-08-26 NOTE — PROGRESS NOTES
Raphael Brown [facility] 79  [address] [facility], [place], [address] [facility]  [phone]      Medical Progress Note      NAME: Maciel Vasquez   :  [birth year]  MRM:  [id number]    Date/Time: 2019         Subjective:     Chief Complaint:  Patient was seen and examined by me. Chart reviewed. No syncope, chest pain, SOB       Objective:       Vitals:       Last 24hrs VS reviewed since prior progress note.  Most recent are:    Visit Vitals  BP (!) 146/91 (BP 1 Location: Right arm)   Pulse 71   Temp 98 °F (36.7 °C)   Resp 12   Ht 6' 2\" (1.88 m)   Wt 73.9 kg (162 lb 14.7 oz)   SpO2 98%   BMI 20.92 kg/m²     SpO2 Readings from Last 6 Encounters:   19 98%   19 92%   18 97%   18 97%   04/02/15 96%   13 99%            Intake/Output Summary (Last 24 hours) at 2019 1213  Last data filed at 2019 0519  Gross per 24 hour   Intake    Output 120 ml   Net -120 ml        Exam:     Physical Exam:    Gen:  Elderly, frail, ill-appearing, NAD  HEENT:  Pink conjunctivae, PERRL, hearing intact to voice, moist mucous membranes  Neck:  Supple, without masses, thyroid non-tender  Resp:  No accessory muscle use, clear breath sounds without wheezes rales or rhonchi  Card:  No murmurs, normal S1, S2 without thrills, bruits or peripheral edema  Abd:  Soft, non-tender, non-distended, normoactive bowel sounds are present  Musc:  No cyanosis or clubbing  Skin:  Chronic open wounds on back, genital herpes (chronic)  Neuro:  Cranial nerves 3-12 are grossly intact, follows commands appropriately, resting tremors  Psych:  Fair insight, oriented to person, place and time, alert    Medications Reviewed: (see below)    Lab Data Reviewed: (see below)    ______________________________________________________________________    Medications:     Current Facility-Administered Medications   Medication Dose Route Frequency    cefTRIAXone (ROCEPHIN) 1 g in 0.9% sodium chloride (MBP/ADV) 50 mL  1 g IntraVENous Q24H    sodium chloride (NS) flush 5-40 mL  5-40 mL IntraVENous Q8H    sodium chloride (NS) flush 5-40 mL  5-40 mL IntraVENous PRN    0.9% sodium chloride infusion  50 mL/hr IntraVENous CONTINUOUS    enoxaparin (LOVENOX) injection 40 mg  40 mg SubCUTAneous Q24H    LORazepam (ATIVAN) injection 2 mg  2 mg IntraVENous Q2H PRN    acetaminophen (TYLENOL) tablet 650 mg  650 mg Oral Q8H PRN    calcium carbonate (OS-FRAN) tablet 500 mg [elemental]  500 mg Oral DAILY    carbidopa-levodopa (SINEMET)  mg per tablet 1.5 Tab  1.5 Tab Oral TID    DULoxetine (CYMBALTA) capsule 60 mg  60 mg Oral QHS    fludrocortisone (FLORINEF) tablet 0.2 mg  0.2 mg Oral BID    ferrous sulfate tablet 325 mg  325 mg Oral ACB    levothyroxine (SYNTHROID) tablet 200 mcg  200 mcg Oral ACB    levothyroxine (SYNTHROID) tablet 25 mcg  25 mcg Oral ACB    memantine (NAMENDA) tablet 5 mg  5 mg Oral DAILY    HYDROcodone-acetaminophen (NORCO) 5-325 mg per tablet 1 Tab  1 Tab Oral Q4H PRN    triamcinolone acetonide (KENALOG) 0.1 % cream   Topical QHS          Lab Review:     Recent Labs     08/26/19  0032 08/25/19  1300   WBC 4.8 5.2   HGB 10.2* 11.9*   HCT 31.4* 36.6    351     Recent Labs     08/26/19  0032 08/25/19  1300    141   K 4.0 4.4   * 106   CO2 28 31   GLU 82 94   BUN 24* 22*   CREA 1.14 1.32*   CA 8.5 8.9   MG 2.3  --    PHOS 3.7  --    ALB  --  3.2*   TBILI  --  0.5   SGOT  --  26   ALT  --  27     No results found for: GLUCPOC       Assessment / Plan:     Principal Problem:    69 yo hx of HTN, orthostatic hypotension, Parkinson's w/ deep brain stimulator, presented w/ AMS, syncope, shaking, possible seizures    1) Syncope and collapse/acute met encephalopathy: now improving. Unclear etiology. Awaiting echo, EEG. Neuro following    2) UTI: monitor Cx. Start IV CTX    3) Orthostatic hypotension: cont florinef. Holding midodrine due to HTN    4) HTN: BP elevated.   Not on meds due to orthostasis    5) Parkinson's: has deep brain stimulator. Cont sinemet    6) Hypothyroid: cont synthroid    7) Depression/anxiety: cont cymbalta, namenda     8) Chronic skin wounds/genital herpes: hx of skin cancer, biopsies.   Wound care team following    Total time spent with patient: 35 min                  Care Plan discussed with: Patient, nursing    Discussed:  Care Plan    Prophylaxis:  Lovenox    Disposition:  SNF/LTC           ___________________________________________________    Attending Physician: Eddie Park MD

## 2019-08-26 NOTE — PROGRESS NOTES
Shift Change:  Bedside and Verbal shift change report given to Radha Hill RN (oncoming nurse) by Rashid Griffin (offgoing nurse). Report included the following information SBAR and Kardex. Shift Summary:  2100: STAND test performed, passed. meds given. Pt resting.  0500: Performed dual skin with Travis Barber RN, foundings warrant wound consult. Put in wound consult order. End of Shift Report[de-identified]  Bedside and Verbal shift change report given to Allyson Vasques RN (oncoming nurse) by Radha Hill RN (offgoing nurse). Report included the following information SBAR and Kardex.

## 2019-08-27 VITALS
RESPIRATION RATE: 16 BRPM | SYSTOLIC BLOOD PRESSURE: 102 MMHG | HEART RATE: 73 BPM | WEIGHT: 162.92 LBS | BODY MASS INDEX: 20.91 KG/M2 | TEMPERATURE: 98 F | HEIGHT: 74 IN | DIASTOLIC BLOOD PRESSURE: 56 MMHG | OXYGEN SATURATION: 99 %

## 2019-08-27 PROBLEM — N39.0 UTI (URINARY TRACT INFECTION): Status: ACTIVE | Noted: 2019-08-27

## 2019-08-27 LAB
ANION GAP SERPL CALC-SCNC: 5 MMOL/L (ref 5–15)
BUN SERPL-MCNC: 21 MG/DL (ref 6–20)
BUN/CREAT SERPL: 22 (ref 12–20)
CALCIUM SERPL-MCNC: 8.3 MG/DL (ref 8.5–10.1)
CHLORIDE SERPL-SCNC: 107 MMOL/L (ref 97–108)
CO2 SERPL-SCNC: 29 MMOL/L (ref 21–32)
CREAT SERPL-MCNC: 0.97 MG/DL (ref 0.7–1.3)
ERYTHROCYTE [DISTWIDTH] IN BLOOD BY AUTOMATED COUNT: 12.5 % (ref 11.5–14.5)
FOLATE SERPL-MCNC: 8.3 NG/ML (ref 5–21)
GLUCOSE SERPL-MCNC: 98 MG/DL (ref 65–100)
HCT VFR BLD AUTO: 29.6 % (ref 36.6–50.3)
HGB BLD-MCNC: 9.6 G/DL (ref 12.1–17)
MAGNESIUM SERPL-MCNC: 2.1 MG/DL (ref 1.6–2.4)
MCH RBC QN AUTO: 31.3 PG (ref 26–34)
MCHC RBC AUTO-ENTMCNC: 32.4 G/DL (ref 30–36.5)
MCV RBC AUTO: 96.4 FL (ref 80–99)
NRBC # BLD: 0 K/UL (ref 0–0.01)
NRBC BLD-RTO: 0 PER 100 WBC
PHOSPHATE SERPL-MCNC: 3.2 MG/DL (ref 2.6–4.7)
PLATELET # BLD AUTO: 255 K/UL (ref 150–400)
PMV BLD AUTO: 10.7 FL (ref 8.9–12.9)
POTASSIUM SERPL-SCNC: 3.5 MMOL/L (ref 3.5–5.1)
RBC # BLD AUTO: 3.07 M/UL (ref 4.1–5.7)
SODIUM SERPL-SCNC: 141 MMOL/L (ref 136–145)
T3FREE SERPL-MCNC: 1.4 PG/ML (ref 2.2–4)
T4 FREE SERPL-MCNC: 1.5 NG/DL (ref 0.8–1.5)
VIT B12 SERPL-MCNC: 483 PG/ML (ref 193–986)
WBC # BLD AUTO: 5.6 K/UL (ref 4.1–11.1)

## 2019-08-27 PROCEDURE — 82746 ASSAY OF FOLIC ACID SERUM: CPT

## 2019-08-27 PROCEDURE — 74011250636 HC RX REV CODE- 250/636: Performed by: INTERNAL MEDICINE

## 2019-08-27 PROCEDURE — 36415 COLL VENOUS BLD VENIPUNCTURE: CPT

## 2019-08-27 PROCEDURE — 74011250637 HC RX REV CODE- 250/637: Performed by: INTERNAL MEDICINE

## 2019-08-27 PROCEDURE — 80048 BASIC METABOLIC PNL TOTAL CA: CPT

## 2019-08-27 PROCEDURE — 84439 ASSAY OF FREE THYROXINE: CPT

## 2019-08-27 PROCEDURE — 83735 ASSAY OF MAGNESIUM: CPT

## 2019-08-27 PROCEDURE — 84481 FREE ASSAY (FT-3): CPT

## 2019-08-27 PROCEDURE — 94760 N-INVAS EAR/PLS OXIMETRY 1: CPT

## 2019-08-27 PROCEDURE — 85027 COMPLETE CBC AUTOMATED: CPT

## 2019-08-27 PROCEDURE — 82607 VITAMIN B-12: CPT

## 2019-08-27 PROCEDURE — 84100 ASSAY OF PHOSPHORUS: CPT

## 2019-08-27 PROCEDURE — 74011000258 HC RX REV CODE- 258: Performed by: INTERNAL MEDICINE

## 2019-08-27 RX ORDER — CEFDINIR 300 MG/1
300 CAPSULE ORAL 2 TIMES DAILY
Qty: 10 CAP | Refills: 0 | Status: SHIPPED | OUTPATIENT
Start: 2019-08-27 | End: 2019-09-01

## 2019-08-27 RX ADMIN — Medication 10 ML: at 06:36

## 2019-08-27 RX ADMIN — MIDODRINE HYDROCHLORIDE 2.5 MG: 5 TABLET ORAL at 02:05

## 2019-08-27 RX ADMIN — CALCIUM 500 MG: 500 TABLET ORAL at 09:11

## 2019-08-27 RX ADMIN — FLUDROCORTISONE ACETATE 0.2 MG: 0.1 TABLET ORAL at 09:10

## 2019-08-27 RX ADMIN — MEMANTINE HYDROCHLORIDE 5 MG: 10 TABLET ORAL at 09:10

## 2019-08-27 RX ADMIN — FERROUS SULFATE TAB 325 MG (65 MG ELEMENTAL FE) 325 MG: 325 (65 FE) TAB at 06:34

## 2019-08-27 RX ADMIN — CARBIDOPA AND LEVODOPA 1.5 TABLET: 25; 100 TABLET ORAL at 09:10

## 2019-08-27 RX ADMIN — CEFTRIAXONE SODIUM 1 G: 1 INJECTION, POWDER, FOR SOLUTION INTRAMUSCULAR; INTRAVENOUS at 09:09

## 2019-08-27 RX ADMIN — LEVOTHYROXINE SODIUM 25 MCG: 50 TABLET ORAL at 06:34

## 2019-08-27 RX ADMIN — LEVOTHYROXINE SODIUM 200 MCG: 100 TABLET ORAL at 06:34

## 2019-08-27 NOTE — PROGRESS NOTES
8- CASE MANAGEMENT NOTE:  Arrangements have been made for the patient to return to [facility] today via [facility] ambulance-referral sent thru [employer]. I sent the discharge instructions and discharge summary to [facility] via [employer] and these plus the prescription will also accompany him. The patient and his brother, Víctor Sheikh ([phone]), were informed of the arrangements and the RN has been instructed to call report. Care Management Interventions  PCP Verified by CM:  Yes  Mode of Transport at Discharge: ALS  Discharge Durable Medical Equipment: No  Physical Therapy Consult: Yes  Occupational Therapy Consult: Yes  Speech Therapy Consult: Yes  Current Support Network: Yael discussed with Pt/Family/Caregiver: Yes  Freedom of Choice Offered: (S) Yes  Discharge Location  Discharge Placement: (Return to [facility])    [address] [place], [place] [address], CM

## 2019-08-27 NOTE — PROGRESS NOTES
Discharge order written. Patient to return to [facility]. All Care Plans and Education are complete. Dr. John Su provided RX for [facility]. Provided a Medication and Side Effects Guide for patient education. Updated AVS MAR with last dose given.

## 2019-08-27 NOTE — DISCHARGE SUMMARY
Raphael Brown [facility] [address]  [address]  [phone]    Physician Discharge Summary     Patient ID:  Gasper Granda  [id number]  30 y.o.  1945    Admit date: 8/25/2019    Discharge date and time: 8/27/2019    Admission Diagnoses: Syncope and collapse [R55]  Syncope and collapse [R55]    Discharge Diagnoses:  Principal Diagnosis Syncope and collapse                                            Principal Problem:    Syncope and collapse (8/25/2019)    Active Problems:    Seizure ([facility] (8/25/2019)      Parkinson's disease ([facility] (8/25/2019)      Hypertension (8/25/2019)      Chronic pain (8/25/2019)      Overview: LEGS      Depression with anxiety (8/25/2019)      Acquired hypothyroidism (8/25/2019)      S/P deep brain stimulator placement (8/25/2019)      UTI (urinary tract infection) (8/27/2019)           Hospital Course:     69 yo hx of HTN, orthostatic hypotension, Parkinson's w/ deep brain stimulator, presented w/ AMS, syncope, shaking, possible seizures     1) Syncope and collapse/acute met encephalopathy: now resolved. Unclear etiology, likely from UTI. EEG was neg for seizures. Neuro was following     2) UTI: UCx pending. Patient was on IV CTX. Will finish a course of omnicef     3) Orthostatic hypotension: cont florinef, midodrine     4) HTN: BP elevated. Not on meds due to orthostasis     5) Parkinson's: has deep brain stimulator. Cont sinemet     6) Hypothyroid: cont synthroid     7) Depression/anxiety: cont cymbalta, namenda      8) Chronic skin wounds/genital herpes: hx of skin cancer, biopsies.   Cont wound care    PCP: Brown Meyers MD     Consults: Neurology    Significant Diagnostic Studies: EEG    Discharge Exam:  Physical Exam:    Gen:  Elderly, frail, ill-appearing, NAD  HEENT:  Pink conjunctivae, PERRL, hearing intact to voice, moist mucous membranes  Neck:  Supple, without masses, thyroid non-tender  Resp:  No accessory muscle use, clear breath sounds without wheezes rales or rhonchi  Card:  No murmurs, normal S1, S2 without thrills, bruits or peripheral edema  Abd:  Soft, non-tender, non-distended, normoactive bowel sounds are present  Musc:  No cyanosis or clubbing  Skin:  Chronic open wounds on back, genital herpes (chronic)  Neuro:  Cranial nerves 3-12 are grossly intact, follows commands appropriately, resting tremors  Psych:  Fair insight, oriented to person, place and time, alert    Disposition: long term care ([facility])  Discharge Condition: Stable    Patient Instructions:   Current Discharge Medication List      START taking these medications    Details   cefdinir (OMNICEF) 300 mg capsule Take 1 Cap by mouth two (2) times a day for 5 days. Qty: 10 Cap, Refills: 0         CONTINUE these medications which have NOT CHANGED    Details   calcium carbonate (OS-FRAN) 500 mg calcium (1,250 mg) tablet Take 1 Tab by mouth daily. mometasone (ELOCON) 0.1 % topical cream Apply  to affected area nightly. Apply to right ear topically at bedtime related to basal cell carcinoma of skin      HYDROcodone-acetaminophen (NORCO) 7.5-325 mg per tablet Take 2 Tabs by mouth every six (6) hours as needed for Pain. !! levothyroxine (SYNTHROID) 200 mcg tablet Take 200 mcg by mouth Daily (before breakfast). !! levothyroxine (SYNTHROID) 25 mcg tablet Take 25 mcg by mouth Daily (before breakfast). magnesium oxide (MAG-OX) 400 mg tablet Take 400 mg by mouth two (2) times a day. midodrine (PROAMITINE) 2.5 mg tablet Take 2.5 mg by mouth two (2) times a day. memantine (NAMENDA) 5 mg tablet Take 5 mg by mouth daily. carbidopa-levodopa (SINEMET)  mg per tablet Take 1.5 Tabs by mouth three (3) times daily. docusate sodium (COLACE) 100 mg capsule Take 100 mg by mouth daily. ferrous sulfate (IRON) 325 mg (65 mg iron) tablet Take 325 mg by mouth Daily (before breakfast).       !! potassium chloride (K-DUR, KLOR-CON) 20 mEq tablet Take 40 mEq by mouth daily. !! potassium chloride (K-DUR, KLOR-CON) 20 mEq tablet Take 20 mEq by mouth nightly. acetaminophen (TYLENOL) 325 mg tablet Take 650 mg by mouth every eight (8) hours as needed for Pain. DULoxetine (CYMBALTA) 60 mg capsule Take 60 mg by mouth nightly. fludrocortisone (FLORINEF) 0.1 mg tablet Take 0.2 mg by mouth two (2) times a day. !! - Potential duplicate medications found. Please discuss with provider.         Activity: Activity as tolerated  Diet: Regular Diet  Wound Care: As directed    Follow-up with  Follow-up Information     Follow up With Specialties Details Why Kd Douglass MD Family Practice Schedule an appointment as soon as possible for a visit in 1 week  [address] [facility] [address]      Kristine Grissom MD Neurology Schedule an appointment as soon as possible for a visit in 2 weeks As needed, If symptoms worsen [address] [facility] [address] [place] [address]  [phone]            Follow-up tests/labs none    Signed:  Andrew Myers MD  8/27/2019  8:59 AM    I spent 35 min on discharge

## 2019-08-27 NOTE — DISCHARGE INSTRUCTIONS
HOSPITALIST DISCHARGE INSTRUCTIONS  NAME: Lisa Lizarraga   :  1945   MRN:  [id number]     Date/Time:  2019 8:58 AM    ADMIT DATE: 2019     DISCHARGE DATE: 2019     ADMITTING DIAGNOSIS:  Confusion, syncope, UTI    DISCHARGE DIAGNOSIS:  same    MEDICATIONS:  See after visit summary       · It is important that you take the medication exactly as they are prescribed. · Keep your medication in the bottles provided by the pharmacist and keep a list of the medication names, dosages, and times to be taken in your wallet. · Do not take other medications without consulting your doctor     Pain Management: per above medications    What to do at Home    Recommended diet:  Regular Diet    Recommended activity: Activity as tolerated    1) Return to the hospital if you feel worse    2) If you experience any of the following symptoms then please call your primary care physician or return to the emergency room if you cannot get hold of your doctor:  Fever, chills, nausea, vomiting, diarrhea, change in mentation, falling, bleeding, shortness of breath, chest pain, severe headache, severe abdominal pain,     3) EEG was negative for seizures    4) Complete a course of antibiotic for UTI    5) Follow up with your doctors    Follow Up: Follow-up Information     Follow up With Specialties Details Why Contact Info    Fanny Morley MD [facility] Schedule an appointment as soon as possible for a visit in 1 week  [address] [place] [address]  [phone]      Melina Corona MD Neurology Schedule an appointment as soon as possible for a visit in 2 weeks As needed, If symptoms worsen [address] [address] [facility]  [phone]              Information obtained by :  I understand that if any problems occur once I am at home I am to contact my physician. I understand and acknowledge receipt of the instructions indicated above. [de-identified] or R.N.'s Signature                                                                  Date/Time                                                                                                                                              Patient or Representative Signature                                                          Date/Time    Patient Education        Urinary Tract Infections in Men: Care Instructions  Your Care Instructions    A urinary tract infection, or UTI, is a general term for an infection anywhere between the kidneys and the tip of the penis. UTIs can also be a result of a prostate problem. Most cause pain or burning when you urinate. Most UTIs are caused by bacteria and can be cured with antibiotics. It is important to complete your treatment so that the infection does not get worse. Follow-up care is a key part of your treatment and safety. Be sure to make and go to all appointments, and call your doctor if you are having problems. It's also a good idea to know your test results and keep a list of the medicines you take. How can you care for yourself at home? · Take your antibiotics as prescribed. Do not stop taking them just because you feel better. You need to take the full course of antibiotics. · Take your medicines exactly as prescribed. Your doctor may have prescribed a medicine, such as phenazopyridine (Pyridium), to help relieve pain when you urinate. This turns your urine orange. You may stop taking it when your symptoms get better. But be sure to take all of your antibiotics, which treat the infection. · Drink extra water for the next day or two. This will help make the urine less concentrated and help wash out the bacteria causing the infection.  (If you have kidney, heart, or liver disease and have to limit your fluids, talk with your doctor before you increase your fluid intake.)  · Avoid drinks that are carbonated or have caffeine. They can irritate the bladder. · Urinate often. Try to empty your bladder each time. · To relieve pain, take a hot bath or lay a heating pad (set on low) over your lower belly or genital area. Never go to sleep with a heating pad in place. To help prevent UTIs  · Drink plenty of fluids, enough so that your urine is light yellow or clear like water. If you have kidney, heart, or liver disease and have to limit fluids, talk with your doctor before you increase the amount of fluids you drink. · Urinate when you have the urge. Do not hold your urine for a long time. Urinate before you go to sleep. · Keep your penis clean. Catheter care  If you have a drainage tube (catheter) in place, the following steps will help you care for it. · Always wash your hands before and after touching your catheter. · Check the area around the urethra for inflammation or signs of infection. Signs of infection include irritated, swollen, red, or tender skin, or pus around the catheter. · Clean the area around the catheter with soap and water two times a day. Dry with a clean towel afterward. · Do not apply powder or lotion to the skin around the catheter. To empty the urine collection bag  · Wash your hands with soap and water. · Without touching the drain spout, remove the spout from its sleeve at the bottom of the collection bag. Open the valve on the spout. · Let the urine flow out of the bag and into the toilet or a container. Do not let the tubing or drain spout touch anything. · After you empty the bag, clean the end of the drain spout with tissue and water. Close the valve and put the drain spout back into its sleeve at the bottom of the collection bag. · Wash your hands with soap and water. When should you call for help?   Call your doctor now or seek immediate medical care if:    · Symptoms such as a fever, chills, nausea, or vomiting get worse or happen for the first time.     · You have new pain in your back just below your rib cage. This is called flank pain.     · There is new blood or pus in your urine.     · You are not able to take or keep down your antibiotics.    Watch closely for changes in your health, and be sure to contact your doctor if:    · You are not getting better after taking an antibiotic for 2 days.     · Your symptoms go away but then come back. Where can you learn more? Go to http://ikm-david.info/. Enter B688 in the search box to learn more about \"Urinary Tract Infections in Men: Care Instructions. \"  Current as of: December 19, 2018  Content Version: 12.1  © 2347-6434 [employer]wise, [employer]. Care instructions adapted under license by [employer] (which disclaims liability or warranty for this information). If you have questions about a medical condition or this instruction, always ask your healthcare professional. [address] any warranty or liability for your use of this information.

## 2019-08-27 NOTE — PROGRESS NOTES
0715 Bedside and Verbal shift change report given to [address] (oncoming nurse) by Merry Jasso (offgoing nurse). Report included the following information SBAR, Kardex, Recent Results and Cardiac Rhythm NSR.     1100 TRANSFER - OUT REPORT:    Verbal report given to Belem Saez RN(name) on Bhavana Kenn  being transferred to [facility] for routine progression of care       Report consisted of patients Situation, Background, Assessment and   Recommendations(SBAR). Information from the following report(s) SBAR, Kardex, Recent Results and Cardiac Rhythm NSR, Gill Mueller was reviewed with the receiving nurse. Lines:       Opportunity for questions and clarification was provided.       Patient transported with:AMS   Monitor

## 2019-08-27 NOTE — PROGRESS NOTES
Shift Change:  Bedside and Verbal shift change report given to Dimitry Mcdonnell RN (oncoming nurse) by Radha Avelar (offgoing nurse). Report included the following information SBAR and Kardex. Shift Summary:  2100: Placed condom cath on pt due to multiple accident with urinal voiding, trying to avoid further skin issues. End of Shift Report[de-identified]  Bedside and Verbal shift change report given to [address] [place] [address] Road (oncoming nurse) by Dimitry Mcdonnell RN (offgoing nurse). Report included the following information SBAR and Kardex.

## 2019-08-28 LAB
BACTERIA SPEC CULT: ABNORMAL
CC UR VC: ABNORMAL
SERVICE CMNT-IMP: ABNORMAL

## 2020-10-05 NOTE — PERIOP NOTES
Dear Lyla,   I am writing to report that your included test results are within expected ranges. I do not suggest that we make any changes at this time.  Rajan Butt M.D.   Patient given surgical site infection FAQ handout Preop instructions reviewed and patient verbalizes understanding of instructions. Patient has been given the opportunity to ask additional questions.

## 2022-07-26 NOTE — ANESTHESIA POSTPROCEDURE EVALUATION
Post-Anesthesia Evaluation and Assessment Patient: Brandy Lopez MRN: [id number]  SSN: [id number] YOB: 1945  Age: 68 y.o. Sex: male I have evaluated the patient and they are stable and ready for discharge from the PACU. Cardiovascular Function/Vital Signs Visit Vitals /84 Pulse 69 Temp 36.6 °C (97.9 °F) Resp 12 Ht 6' 2\" (1.88 m) Wt 86.2 kg (190 lb) SpO2 100% BMI 24.39 kg/m² Patient is status post General anesthesia for Procedure(s): RIGHT EAR BLEEDING POST-OP. Nausea/Vomiting: None Postoperative hydration reviewed and adequate. Pain: 
Pain Scale 1: FLACC (05/20/19 1922) Pain Intensity 1: 8 (05/20/19 1820) Managed Neurological Status:  
Neuro (WDL): Within Defined Limits (05/20/19 1922) Neuro Neurologic State: Drowsy (05/20/19 1922) Orientation Level: Oriented to place;Oriented to situation;Oriented to person;Disoriented to time (05/20/19 1130) Cognition: Follows commands;Decreased attention/concentration (05/20/19 1515) Speech: Clear;Delayed responses (05/20/19 1515) LUE Motor Response: Tremorous (05/20/19 1515) LLE Motor Response: Weak;Tremorous (05/20/19 1515) RUE Motor Response: Weak;Tremorous (05/20/19 1515) RLE Motor Response: Tremorous (05/20/19 1515) At baseline Mental Status, Level of Consciousness: Alert and  oriented to person, place, and time Pulmonary Status:  
O2 Device: CO2 nasal cannula (05/20/19 1924) Adequate oxygenation and airway patent Complications related to anesthesia: None Post-anesthesia assessment completed. No concerns Signed By: Jeramie Graham MD   
 May 20, 2019 Procedure(s): RIGHT EAR BLEEDING POST-OP. general 
 
<BSHSIANPOST> Vitals Value Taken Time /80 5/20/2019  7:45 PM  
Temp 36.6 °C (97.9 °F) 5/20/2019  7:24 PM  
Pulse 69 5/20/2019  7:52 PM  
Resp 12 5/20/2019  7:52 PM  
SpO2 100 % 5/20/2019  7:52 PM  
Vitals shown include unvalidated device data. [Mother] : mother

## 2022-12-09 NOTE — H&P
[facility]    Hospitalist Admission Note      NAME:  Reid Bullock   :      MRN:  [id number]     PCP:  Charlie Shah MD     Date/Time:  2019 3:29 PM          Subjective:   Keshia Gouge to history: Clinical condition surrounding inciting event    CHIEF COMPLAINT: Passing out/Seizure     HISTORY OF PRESENT ILLNESS:     Mr. Jo Ayala is a 68 y.o.  male with a hx of skin cancer, thyroid cancer, hypothyroidism, parkinson's with deep brain stim in situ, anxiety/depression who presented to the Emergency Department complaining of passing out. Patient currently resides in LTC at [facility] where a staff member witnessed patients arms extend outward, start shaking and become unresponsive. Patient came to and recalled none of the events. We will admit for further evaluation.       Past Medical History:   Diagnosis Date    Arthritis     Cancer ([facility] [address] 2011    BCCA,SCCA CHEEK AND NOSE    Chronic pain     LEGS    Depression with anxiety     Elevated cholesterol     Essential tremor     ADRIAN ARMS- LEFT IS WORSE    GERD (gastroesophageal reflux disease)     h/o Thyroid cancer 2009    levothyroxine    Hypertension     Ill-defined condition     PARKINSONS DISEASE    Parkinson's disease ([facility] [address]     Psychiatric disorder     ANXIETY AND DEPRESSION        Past Surgical History:   Procedure Laterality Date    HX GI      COLONOSCOPY    HX HEENT  2009    mass removed from neck    HX ORTHOPAEDIC      RIGHT BUNIONECTOMY    HX OTHER SURGICAL  2009    thyroidectomy    HX OTHER SURGICAL      MANY BCCA REMOVAL WITH MAC    HX OTHER SURGICAL      FUNCTIONAL IMPLANT- LEFT CHEST    HX RETINAL DETACHMENT REPAIR         Social History     Tobacco Use    Smoking status: Never Smoker    Smokeless tobacco: Never Used   Substance Use Topics    Alcohol use: Yes     Comment: OCCASIONALLY        Family History   Problem Relation Age of Onset    Cancer Mother         BCCA    Heart Disease Mother [Mother] : mother CHF    Alcohol abuse Father     Stroke Father     Hypertension Father     No Known Problems Brother     No Known Problems Son     No Known Problems Daughter     Anesth Problems Neg Hx         No Known Allergies     Prior to Admission medications    Medication Sig Start Date End Date Taking? Authorizing Provider   carbidopa-levodopa (SINEMET)  mg per tablet Take 1.5 Tabs by mouth three (3) times daily. Provider, Historical   docusate sodium (COLACE) 100 mg capsule Take 100 mg by mouth daily. Provider, Historical   ferrous sulfate (IRON) 325 mg (65 mg iron) tablet Take  by mouth Daily (before breakfast). Provider, Historical   potassium chloride (K-DUR, KLOR-CON) 20 mEq tablet Take 40 mEq by mouth daily. Provider, Historical   potassium chloride (K-DUR, KLOR-CON) 20 mEq tablet Take 20 mEq by mouth nightly. Provider, Historical   acetaminophen (TYLENOL) 325 mg tablet Take 650 mg by mouth every six (6) hours as needed for Pain. Provider, Historical   DULoxetine (CYMBALTA) 60 mg capsule Take 60 mg by mouth nightly. Provider, Historical   fludrocortisone (FLORINEF) 0.1 mg tablet Take 0.2 mg by mouth two (2) times a day. Provider, Historical   levothyroxine sodium (LEVOTHYROXINE PO) Take 225 mcg by mouth daily. Provider, Historical   acetaminophen (TYLENOL EXTRA STRENGTH) 500 mg tablet Take 500 mg by mouth every twelve (12) hours as needed for Pain (NO MORE THAN 4000MG PER DAY).     Provider, Historical       Review of Systems:  (bold if positive, if negative)    Gen:  Eyes:  ENT:  CVS:  syncopePulm:  GI:  :  MS:  Skin:  Psych:  Endo:  Hem:  Renal:  Neuro:  seizure      Objective:      VITALS:    Vital signs reviewed; most recent are:    Visit Vitals  /59   Pulse 61   Temp 98.3 °F (36.8 °C)   Resp 14   Ht 6' 2\" (1.88 m)   Wt 73.9 kg (163 lb)   SpO2 100%   BMI 20.93 kg/m²     SpO2 Readings from Last 6 Encounters:   08/25/19 100%   05/21/19 92%   08/20/18 97%   07/27/18 97% 04/02/15 96%   01/24/13 99%        No intake or output data in the 24 hours ending 08/25/19 1529     Exam:     Physical Exam:    Gen:  Well-developed, well-nourished, in no acute distress  HEENT:  Pink conjunctivae, PERRL, hearing intact to voice, moist mucous membranes  Neck:  Supple, without masses, thyroid non-tender  Resp:  No accessory muscle use, clear breath sounds without wheezes rales or rhonchi  Card:  No murmurs, normal S1, S2 without thrills, bruits or peripheral edema  Abd:  Soft, non-tender, non-distended, normoactive bowel sounds are present, no palpable organomegaly and no detectable hernias  Lymph:  No cervical or inguinal adenopathy  Musc:  No cyanosis or clubbing  Skin:  No rashes or ulcers, skin turgor is good  Neuro:  Cranial nerves are grossly intact, no focal motor weakness, follows commands appropriately  Psych:  Good insight, oriented to person, place and time, alert     Labs:    Recent Labs     08/25/19  1300   WBC 5.2   HGB 11.9*   HCT 36.6        Recent Labs     08/25/19  1300      K 4.4      CO2 31   GLU 94   BUN 22*   CREA 1.32*   CA 8.9   ALB 3.2*   TBILI 0.5   SGOT 26   ALT 27     No results found for: GLUCPOC  No results for input(s): PH, PCO2, PO2, HCO3, FIO2 in the last 72 hours. No results for input(s): INR in the last 72 hours. No lab exists for component: INREXT  All Micro Results     None          I have reviewed previous records       Assessment and Plan:      Principal Problem:    Syncope and collapse. POA. Neurogenic v. cardiogenic. Admit to telemetry. Neurology consult for EEG and evaluation. Check TTE for completeness. Neurochecks. Check TSH, UDS, orthostatics, AM cortisol level. Cont florinef, start IVFs    Active Problems:    Seizure ([address] (8/25/2019). Possible. No hx of seizures and not on AEDs. Neurology consult for EEG and evaluation      Parkinson's disease /  S/P deep brain stimulator placement.  Cont sinemet once timing confirmed by pharmacy      Hypertension (8/25/2019). Not on meds. Appears he may actually have postural hypotension as he is on florinef. Continue this and check orthostatics. Chronic pain (8/25/2019). PRN tylenol      Overview: LEGS      Depression with anxiety (8/25/2019). Continue psych meds once confirmed      Acquired hypothyroidism (8/25/2019) Check TSH. Cont LT4     Code status: DNR (confirmed at bedside with competent patient)    Telemetry reviewed:   normal sinus rhythm    Risk of deterioration: high      Total time spent with patient: 79 Minutes I reviewed chart, notes, data and current medications in the medical record. I have examined and treated the patient at bedside during this period.                  Care Plan discussed with: Patient, Nursing Staff and >50% of time spent in counseling and coordination of care    Discussed:  Code Status, Care Plan and D/C Planning       ___________________________________________________    Attending Physician: Sonal Nolen, DO

## (undated) DEVICE — BIPOLAR FORCEPS CORD: Brand: VALLEYLAB

## (undated) DEVICE — PACK,EENT,TURBAN DRAPE,PK II: Brand: MEDLINE

## (undated) DEVICE — SUTURE PLN GUT SZ 4-0 P-3 L18IN ABSRB YELLOWISH TAN L13MM 1644G

## (undated) DEVICE — REM POLYHESIVE ADULT PATIENT RETURN ELECTRODE: Brand: VALLEYLAB

## (undated) DEVICE — COTTON BALLS: Brand: DEROYAL

## (undated) DEVICE — SUT PLN 5-0 18IN P3 YEL --

## (undated) DEVICE — MEDI-VAC NON-CONDUCTIVE SUCTION TUBING: Brand: CARDINAL HEALTH

## (undated) DEVICE — SURGICAL PROCEDURE PACK BASIN MAJ SET CUST NO CAUT

## (undated) DEVICE — DRESSING,GAUZE,XEROFORM,CURAD,5"X9",ST: Brand: CURAD

## (undated) DEVICE — ELECTRODE ARTHSCP 15X11MM SHFT 11CM MPLR LOOP GRN DISP W/

## (undated) DEVICE — ELECTRODE ARTHSCP W10MM D10MM SHFT 11CM YEL MPLR LOOP W/

## (undated) DEVICE — KIT APPL SPRY HEMSTAT PWDR -- F/HEMADERM FLEXTIP

## (undated) DEVICE — SUTURE PERMAHAND SZ 2-0 L18IN NONABSORBABLE BLK L26MM PS 1588H

## (undated) DEVICE — STAPLER SKIN 35CT WD STRL DISP -- MULTIFIRE PREMIUM

## (undated) DEVICE — INTENDED FOR TISSUE SEPARATION, AND OTHER PROCEDURES THAT REQUIRE A SHARP SURGICAL BLADE TO PUNCTURE OR CUT.: Brand: BARD-PARKER ® CARBON RIB-BACK BLADES

## (undated) DEVICE — TOWEL SURG W17XL27IN STD BLU COT NONFENESTRATED PREWASHED

## (undated) DEVICE — X-RAY SPONGES,16 PLY: Brand: DERMACEA

## (undated) DEVICE — ELECTRO LUBE IS A SINGLE PATIENT USE DEVICE THAT IS INTENDED TO BE USED ON ELECTROSURGICAL ELECTRODES TO REDUCE STICKING.: Brand: KEY SURGICAL ELECTRO LUBE

## (undated) DEVICE — DRESSING PETRO GZ XRFRM CURAD ST OVERWRAP 5 X 9 IN

## (undated) DEVICE — SOLUTION IRRIG 1000ML H2O STRL BLT

## (undated) DEVICE — 40418 TRENDELENBURG ONE-STEP ARM PROTECTORS LARGE (1 PAIR): Brand: 40418 TRENDELENBURG ONE-STEP ARM PROTECTORS LARGE (1 PAIR)

## (undated) DEVICE — GAUZE SPONGES,12 PLY: Brand: CURITY

## (undated) DEVICE — ROYAL SILK SURGICAL GOWN, XXL: Brand: CONVERTORS

## (undated) DEVICE — AGENT HEMSTAT W2XL14IN OXIDIZED REGENERATED CELOS ABSRB FOR

## (undated) DEVICE — INFECTION CONTROL KIT SYS

## (undated) DEVICE — NEEDLE HYPO 25GA L1.5IN BLU POLYPR HUB S STL REG BVL STR

## (undated) DEVICE — SOLUTION IV 1000ML 0.9% SOD CHL

## (undated) DEVICE — SUTURE VCRL SZ 4-0 L18IN ABSRB UD L13MM P-3 3/8 CIR PRIM J494H

## (undated) DEVICE — Z DISCONTINUEDSOLUTION PREP 2OZ 10% POVIDONE IOD SCR CAP BTL

## (undated) DEVICE — INSULATED BLADE ELECTRODE: Brand: EDGE

## (undated) DEVICE — GOWN,AURORA,NON-REINFORCED,2XL: Brand: MEDLINE

## (undated) DEVICE — INSULATED NEEDLE ELECTRODE: Brand: EDGE

## (undated) DEVICE — STERILE POLYISOPRENE POWDER-FREE SURGICAL GLOVES: Brand: PROTEXIS

## (undated) DEVICE — SPONGE GZ W4XL4IN COT 12 PLY TYP VII WVN C FLD DSGN

## (undated) DEVICE — FRAZIER SUCTION INSTRUMENT 7 FR W/CONTROL VENT & OBTURATOR: Brand: FRAZIER

## (undated) DEVICE — DRESSING EAR AD 5.5IN GLSCOCK

## (undated) DEVICE — SUTURE ABSORBABLE MONOFILAMENT 4-0 SC-1 18 IN PLN GUT 1824H

## (undated) DEVICE — Device

## (undated) DEVICE — DRAPE,EENT,SPLIT,STERILE: Brand: MEDLINE

## (undated) DEVICE — BLADE ASSEMB CLP HAIR FINE --

## (undated) DEVICE — SUTURE VCRL SZ 4-0 L27IN ABSRB UD L19MM PS-2 3/8 CIR PRIM J426H

## (undated) DEVICE — TELFA NON-ADHERENT ABSORBENT DRESSING: Brand: TELFA

## (undated) DEVICE — MARKER,SKIN,WI/RULER AND LABELS: Brand: MEDLINE

## (undated) DEVICE — APPLICATOR FBR TIP L6IN COT TIP WOOD SHFT SWAB 2000 PER CA

## (undated) DEVICE — 1200 GUARD II KIT W/5MM TUBE W/O VAC TUBE: Brand: GUARDIAN

## (undated) DEVICE — 3M™ TEGADERM™ TRANSPARENT FILM DRESSING FRAME STYLE, 1629, 8 IN X 12 IN (20 CM X 30 CM), 10/CT 8CT/CASE: Brand: 3M™ TEGADERM™

## (undated) DEVICE — SYR 10ML CTRL LR LCK NSAF LF --

## (undated) DEVICE — SUTURE VCRL SZ 3-0 L27IN ABSRB UD L17MM RB-1 1/2 CIR J215H

## (undated) DEVICE — DEVON™ KNEE AND BODY STRAP 60" X 3" (1.5 M X 7.6 CM): Brand: DEVON

## (undated) DEVICE — KENDALL SCD EXPRESS SLEEVES, KNEE LENGTH, MEDIUM: Brand: KENDALL SCD

## (undated) DEVICE — STRAP POS KNEE BODY VELC

## (undated) DEVICE — DRESSING PETRO W3XL8IN N ADH OIL EMUL GZ CURAD

## (undated) DEVICE — HANDLE LT SNAP ON ULT DURABLE LENS FOR TRUMPF ALC DISPOSABLE

## (undated) DEVICE — 10FR FRAZIER SUCTION HANDLE: Brand: CARDINAL HEALTH

## (undated) DEVICE — SUTURE VCRL SZ 5-0 L18IN ABSRB UD L13MM P-3 3/8 CIR PRIM J493G

## (undated) DEVICE — CURITY NON-ADHERENT STRIPS: Brand: CURITY

## (undated) DEVICE — ROCKER SWITCH PENCIL BLADE ELECTRODE, HOLSTER: Brand: EDGE

## (undated) DEVICE — COTTON TIPPED APPLICATORS: Brand: DEROYAL

## (undated) DEVICE — FRAZIER SUCTION INSTRUMENT 8 FR W/CONTROL VENT & OBTURATOR: Brand: FRAZIER

## (undated) DEVICE — TRAY PREP DRY W/ PREM GLV 2 APPL 6 SPNG 2 UNDPD 1 OVERWRAP

## (undated) DEVICE — SUTURE VCRL SZ 5-0 L18IN ABSRB UD P-3 L13MM 3/8 CIR PRIM J493H

## (undated) DEVICE — DRESSING EAR PED 3.5IN PLAS SHELL 2 MOLD PDDED ADJ VELC